# Patient Record
Sex: FEMALE | Race: WHITE | NOT HISPANIC OR LATINO | Employment: OTHER | URBAN - NONMETROPOLITAN AREA
[De-identification: names, ages, dates, MRNs, and addresses within clinical notes are randomized per-mention and may not be internally consistent; named-entity substitution may affect disease eponyms.]

---

## 2017-01-03 RX ORDER — OMEPRAZOLE 40 MG/1
CAPSULE, DELAYED RELEASE ORAL
Qty: 30 CAPSULE | Refills: 5 | Status: SHIPPED | OUTPATIENT
Start: 2017-01-03 | End: 2017-07-07 | Stop reason: SDUPTHER

## 2017-01-17 DIAGNOSIS — Z13.820 SCREENING FOR OSTEOPOROSIS: Primary | ICD-10-CM

## 2017-01-20 ENCOUNTER — TELEPHONE (OUTPATIENT)
Dept: FAMILY MEDICINE CLINIC | Facility: CLINIC | Age: 57
End: 2017-01-20

## 2017-01-20 NOTE — TELEPHONE ENCOUNTER
Pr Dr. MARIA LUISA Robison's order Ms. Light has been called with her recent DEXA Scan.  The scan indicates her Osteoporosis has gotten worse.  She has tried several medications in the past but has not been able to tolerate them.  She is willing to try the Prolia but only if her Insurance pays for it.    This information has been relayed back to Dr. MARIA LUISA Robison.

## 2017-01-23 ENCOUNTER — TELEPHONE (OUTPATIENT)
Dept: FAMILY MEDICINE CLINIC | Facility: CLINIC | Age: 57
End: 2017-01-23

## 2017-01-23 DIAGNOSIS — M81.0 OSTEOPOROSIS: Primary | ICD-10-CM

## 2017-01-24 NOTE — TELEPHONE ENCOUNTER
Patient has been called with the information on her Prolia Injections.  She states they have already called her and she should be able to  the injections tomorrow or Wednesday.  Her cost will only be $8.00.    ----- Message from Rosio Robison MD sent at 1/23/2017  7:15 AM CST -----  I will order and try to get PA.   THANKS

## 2017-03-02 ENCOUNTER — OFFICE VISIT (OUTPATIENT)
Dept: CARDIOLOGY | Facility: CLINIC | Age: 57
End: 2017-03-02

## 2017-03-02 VITALS
SYSTOLIC BLOOD PRESSURE: 121 MMHG | DIASTOLIC BLOOD PRESSURE: 70 MMHG | HEART RATE: 64 BPM | BODY MASS INDEX: 24.1 KG/M2 | WEIGHT: 136 LBS | HEIGHT: 63 IN

## 2017-03-02 DIAGNOSIS — I10 ESSENTIAL HYPERTENSION: ICD-10-CM

## 2017-03-02 DIAGNOSIS — R00.2 PALPITATIONS: Primary | ICD-10-CM

## 2017-03-02 DIAGNOSIS — R00.1 BRADYCARDIA: ICD-10-CM

## 2017-03-02 DIAGNOSIS — R56.9 SEIZURES (HCC): ICD-10-CM

## 2017-03-02 DIAGNOSIS — R42 DIZZINESS: ICD-10-CM

## 2017-03-02 DIAGNOSIS — I63.9 CEREBROVASCULAR ACCIDENT (CVA), UNSPECIFIED MECHANISM (HCC): ICD-10-CM

## 2017-03-02 PROCEDURE — 99214 OFFICE O/P EST MOD 30 MIN: CPT | Performed by: INTERNAL MEDICINE

## 2017-03-02 PROCEDURE — 93000 ELECTROCARDIOGRAM COMPLETE: CPT | Performed by: INTERNAL MEDICINE

## 2017-03-02 NOTE — PROGRESS NOTES
[facility] Cardiology  OFFICE NOTE    Cinthia Light  56 y.o. female    03/02/2017  1. Palpitations    2. Essential hypertension    3. Cerebrovascular accident (CVA), unspecified mechanism    4. Seizures    5. Bradycardia    6. 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  She was doing well until August 2016 where she fell and had weakness of the lower extremities and was evaluated at the [facility].  And subsequently was sent home.  She again had a lot of passing out episodes possible like seizure disorder and was started on Keppra at [facility] in [place].  She is seeing a neurologist at [facility] also.  Her EKG showed sinus rhythm at rate of 64 with low voltage.  She has been drinking 4-6 cups of coffee every day to keep her heart rate up or she feels dizzy.  And will do a Holter monitoring to assess her heart rate as she is not on any AV elana blocking agents.              Allergies   Allergen Reactions   • Acetaminophen    • Adhesive Tape    • Baclofen    • Bee Pollen    • Compazine [Prochlorperazine Edisylate]    • Demerol [Meperidine]    • Etodolac    • Lamictal [Lamotrigine]    • Latex    • Lortab [Hydrocodone-Acetaminophen]    • Milk-Related Compounds    • Morphine    • Morphine And Related    • Novocain [Procaine]    • Other      Paper tape, Bee/wasp sting, muscle relaxers   • Prochlorperazine    • Promethazine    • Ranitidine Hcl    • Reglan [Metoclopramide]    • Sulfa Antibiotics    • Tramadol    • Valium [Diazepam]          Past Medical History   Diagnosis Date   • Abnormal findings on diagnostic imaging of liver      Abnormal findings on diagnostic imaging of liver and biliary tract     • Allergic rhinitis    • Backache      Backache - stress fracture      • Chronic pain    • Chronic pancreatitis    • CVA (cerebral vascular accident)    • Diabetes mellitus associated with pancreatic disease    • Disease of esophagus, unspecified    • Diverticulitis of colon    • Dysphagia    • Epilepsy    • Esophagitis      Esophagitis - grade III      • Essential hypertension    • Gastroparesis      Gastroparesis syndrome - 544min      • GERD (gastroesophageal reflux disease) 07/27/2015     Gastroesophageal reflux disease   • Hemorrhoids      Hemorrhoids - internal & external    • Hiatal hernia    • Hyperlipidemia    • Insomnia    • Irritable bowel syndrome    • Keratoconjunctivitis sicca    • Left upper quadrant pain      Left upper quadrant pain - ? partial pancreas divisum on MRCP 9/17/13      • Low back pain    • Mass of chest wall    • Nausea and vomiting    • Neck pain    • Non-diabetic hypoglycemia    • Old myocardial infarction    • Osteoporosis    • Palpitations    • Paroxysmal supraventricular tachycardia    • Primary hyperparathyroidism    • Restless legs    • Secondary hyperparathyroidism    • Seizure disorder    • SOB (shortness of breath)    • Vaginal discharge      Offensive vaginal discharge      • Vitamin deficiency      Vitamin deficiency - D   • Vitreous floaters          Past Surgical History   Procedure Laterality Date   • Carpal tunnel release  11/05/1990     Left carpal tunnel release   • Endoscopy  07/31/2013     Colon endoscopy 92431 (2)-Diverticulum in sigmoid colon, descending colon, & transverse colon. Internal & external hemorrhoids found.   • Procedure generic converted  10/08/2015     Destruction of Benign Lesion (1-14) 04912 (1)   • Other surgical history  08/09/1990     Ear surgery (1)-Excision of cyst from left post auricular area   • Endoscopy  10/22/2014     EGD w/ biopsy 67453 (1) - A hiatus hernia was found in the esophagus. Esophagitis seen. Biopsy taken. Food was found in the body of the stomach   • Endoscopy  07/31/2013     EGD w/ tube 94616  (3)-Esophagitis seen. Biopsy taken. Hiatus hernia in esophagus. Gastritis in stomach. Biopsy taken. Normal dudoenum. Biopsy taken   • Other surgical history  04/27/2015     ERCP 64710 (2) - Limited exam of esophagus, stomach, and duodenum were normal. Pancreatic divisum. Pancreatic stricture at the minor ampulla.Advantix stent placement. Performed at [facility].   • Procedure generic converted  12/18/2014     ESOPHAGUS MOTILITY STUDY 31516 (1)   • Forearm surgery       Forearm or wrist surgery (1) - excision of ganglion cyst, left wrist.Ganglion cyst,volar aspect, left wrist   • Laparoscopy esophagogastric fundoplasty hybrid     • Injection of medication  06/20/2016     Kenalog (1)   • Gastrostomy tube placement  04/26/2007     gastrostomy tube, jehunostomy tube & intraoperative reduction of abdominal contents into the peritoneal cavity. Paraesophageal hiatal hernia   • Procedure generic converted  01/07/2003     REMOVE LESION NECK/CHEST 35536 (1) - Excision of deep soft tissue tumor, posterior neck & upper back.   • Other surgical history  11/27/2002     Remove lymph nodes, neck (1)- Excision of emlarged cervical lymph node. Subcutaneous mass, right postauricular neck. Enlarged cervical lymph node, postauricular area of the neck   • Other surgical history  03/2006     Repair of heart defect (1) - Repair of hole in heart Done @ [facility]   • Mammo screening unilat digital  03/18/2014     SCREENING MAMMOGRAPHY DIGITAL  (Medicare) (1)   • Thoracotomy  04/26/2007     Redo L thoracotomy, partial lung decoration, repair of paraesophageal hernia(Alloderm graft), placement of Marcaine infusion catheters & system, gastrostomy, feeding jejunostomy.   • Injection of medication  07/28/2015     Toradol (1)   • Total abdominal hysterectomy  03/24/2000     Exam under anesthesia & a ANDRE w/ anterior repair. Ureterovaginal prolapse w/ stress urinary incontinence, specially a 2+cystocele & 3+ uterine prolapse    • Tubal abdominal ligation  03/19/1986     postpartum tubal ligation.   • Cardiac catheterization           Family History   Problem Relation Age of Onset   • Hypertension Mother    • Cancer Father    • Lung cancer Father    • Diabetes Maternal Grandmother    • Heart disease Other    • Stroke Other    • Diabetes Other    • Glaucoma Maternal Uncle          Social History     Social History   • Marital status:      Spouse name: N/A   • Number of children: N/A   • Years of education: N/A     Occupational History   • Not on file.     Social History Main Topics   • Smoking status: Never Smoker   • Smokeless tobacco: Never Used   • Alcohol use No   • Drug use: No   • Sexual activity: Yes     Other Topics Concern   • Not on file     Social History Narrative         Current Outpatient Prescriptions   Medication Sig Dispense Refill   • aspirin 325 MG tablet Take 325 mg by mouth daily.     • cholecalciferol (VITAMIN D3) 1000 UNITS tablet Take 1,000 Units by mouth daily.     • denosumab (PROLIA) 60 MG/ML solution syringe Inject  under the skin Every 6 (Six) Months.     • diphenhydrAMINE (BENADRYL) 25 mg capsule Take 25 mg by mouth. 2 tablets in the morning,2 tablets at bedtime     • furosemide (LASIX) 20 MG tablet Take 1 tablet by mouth 2 (Two) Times a Day As Needed (swelling). 60 tablet 2   • levETIRAcetam (KEPPRA) 1000 MG tablet Take 1 tablet by mouth 2 (two) times a day. (Patient taking differently: Take 1,000 mg by mouth Daily.) 180 tablet 2   • lisinopril (PRINIVIL,ZESTRIL) 10 MG tablet Take 1 tablet by mouth Daily. 90 tablet 2   • omeprazole (priLOSEC) 40 MG capsule TAKE ONE CAPSULE BY MOUTH DAILY 30 capsule 5     No current facility-administered medications for this visit.          Review of Systems     Constitution: Denies any fatigue, fever or chills    HENT: Denies any headache, hearing impairment,     Eyes: Denies any blurring of vision, or photophobia     Cardivascular - history of PFO  "closure    Respiratory system-denies any COPD, shortness of breath,   sleep apnea.     Endocrine:   history of hyperlipidemia,      Musculoskeletal:  No history of arthritis with musculoskeletal problems    Gastrointestinal: History of GERD surgery    Genitourinary: No dysuria or hematuria    Neurological:    history of seizure disorder and stroke, no memory problems    Psychiatric/Behavioral:        No history of depression,  No history of bipolar disorder or schizophrenia     Hematological- no history of easy bruising or any bleeding diathesis            OBJECTIVE    Visit Vitals   • /70   • Pulse 64   • Ht 63\" (160 cm)   • Wt 136 lb (61.7 kg)   • BMI 24.09 kg/m2         Physical Exam     Constitutional: is oriented to person, place, and time.     Skin-warm and dry    Well developed and nourished in no acute distress      Head: Normocephalic and atraumatic.     Eyes: Pupils are equal, round, and reactive to light.     Neck: Neck supple. No bruit in the carotids, no elevation of JVD    Cardiovascular: [place] in the fifth intercostal space   Regular rate, and  Rhythm,    S1 greater than S2,  there is a systolic click    Pulmonary/Chest:   Air  Entry is equal on both sides  No wheezing or crackles,      Abdominal: Soft.  No hepatosplenomegaly, bowel sounds are present    Musculoskeletal: No kyphoscoliosis, no significant thickening of the joints    Neurological: is alert and oriented to person, place, and time.    cranial nerve are intact .   No motor or sensory deficit    Extremities-no edema, no radial femoral delay      Psychiatric: He has a normal mood and affect.                  His behavior is normal.             ECG 12 Lead  Date/Time: 3/2/2017 11:22 AM  Performed by: CHRISTIAN STEPHENS  Authorized by: CHRISTIAN STEPHENS   Comparison: not compared with previous ECG   Rhythm: sinus rhythm  Comments: Sinus rhythm with low voltage              Lab Results   Component Value Date    WBC 6.9 11/17/2016    " HGB 11.7 (L) 11/17/2016    HCT 36.1 11/17/2016    MCV 87.4 11/17/2016     11/17/2016     Lab Results   Component Value Date    GLUCOSE 92 11/17/2016    BUN 22 (H) 11/17/2016    CREATININE 1.1 (H) 11/17/2016    CO2 29 11/17/2016    CALCIUM 9.7 11/17/2016    ALBUMIN 4.0 11/17/2016    AST 25 11/17/2016    ALT 28 11/17/2016     No results found for: CHOL  Lab Results   Component Value Date    TRIG 81 06/28/2016    TRIG 87 04/23/2015    TRIG 152 03/13/2014     Lab Results   Component Value Date    HDL 59 (L) 06/28/2016    HDL 77 04/23/2015     Lab Results   Component Value Date    LDLCALC 96 06/28/2016    LDLCALC 90 04/23/2015    LDLCALC 85 03/13/2014     No results found for: LDL  No results found for: HDLLDLRATIO  No components found for: CHOLHDL  Lab Results   Component Value Date    HGBA1C 5.8 (H) 10/20/2016     Lab Results   Component Value Date    TSH 1.21 08/18/2015                  A/P    Bradycardia with dizziness will do a Holter monitoring to rule out significant bradycardia if it is so then she may need a dual-chamber pacemaker.  She'll wear the monitor for couple of days and will follow with me in 2 weeks.  Echo done in December 2016 showed normal LV systolic function.  She is under a lot of stress and depression as she lost her  last month from malignancy.    Osteoporosis she is on Prolia injections, takes lisinopril for high blood pressure.    She takes Prilosec for GERD and also vitamin D supplements.  Follow-up in  3 weeks    Johnny Oquendo MD  3/2/2017  11:18 AM    EMR Dragon/Transcription disclaimer:   Some of this note may be an electronic transcription/translation of spoken language to printed text. The electronic translation of spoken language may permit erroneous, or at times, nonsensical words or phrases to be inadvertently transcribed; Although I have reviewed the note for such errors, some may still exist.

## 2017-03-20 ENCOUNTER — OFFICE VISIT (OUTPATIENT)
Dept: OPHTHALMOLOGY | Facility: CLINIC | Age: 57
End: 2017-03-20

## 2017-03-20 DIAGNOSIS — H53.40 VISUAL FIELD DEFECT: Primary | ICD-10-CM

## 2017-03-20 PROCEDURE — 99213 OFFICE O/P EST LOW 20 MIN: CPT | Performed by: OPHTHALMOLOGY

## 2017-03-20 NOTE — PROGRESS NOTES
Subjective   Cinthia Light is a 56 y.o. female.   Chief Complaint   Patient presents with   • Follow-up     HPI     No change vision; Hx of possible CVA, seeing neurology [facility] Aug 2016: few subcentimeter nonenhancing focal bright signal abnormalities in the bilateral cerebral hemispheres in a parietal  distribution, on the T2-weighted and FLAIR sequence images, nonspecific in nature and could be due to chronic small vessel white  matter ischemic change, migraine headaches, Lyme's disease, old viral infection and extremely less likely due to multiple sclerosis       Last edited by José Sultana MD on 3/20/2017  1:21 PM.       Review of Systems    Objective   Visual Acuity (Snellen - Linear)      Right Left   Dist cc 20/20 -1 20/40       Correction:  Glasses         Wearing Rx      Sphere Cylinder Axis Add   Right -1.00 +0.75 180 +2.50   Left -0.75 +1.75 163 +2.75                 Pupils      Pupils   Right PERRL   Left PERRL            Not recorded         Extraocular Movement      Right Left   Result Full Full              Tonometry (Applanation, 1:22 PM)      Right Left   Pressure 17 17              Main Ophthalmology Exam     External Exam      Right Left    External Normal Normal      Slit Lamp Exam      Right Left    Lids/Lashes Normal Normal    Conjunctiva/Sclera White and quiet White and quiet    Cornea Clear Clear    Anterior Chamber Deep and quiet Deep and quiet    Iris Round and reactive Round and reactive    Lens Clear Clear    Vitreous Normal Normal      Fundus Exam      Right Left    Disc Normal Normal, no palor    Macula Normal Normal    Vessels Normal Normal                Assessment/Plan   Diagnoses and all orders for this visit:    Visual field defect  Comments:  OS, unchange  Plan:   cont f/u with neurology    Return in about 3 months (around 6/20/2017) for Visual Field 30-2.

## 2017-03-23 ENCOUNTER — OFFICE VISIT (OUTPATIENT)
Dept: CARDIOLOGY | Facility: CLINIC | Age: 57
End: 2017-03-23

## 2017-03-23 ENCOUNTER — OFFICE VISIT (OUTPATIENT)
Dept: FAMILY MEDICINE CLINIC | Facility: CLINIC | Age: 57
End: 2017-03-23

## 2017-03-23 VITALS
HEIGHT: 63 IN | SYSTOLIC BLOOD PRESSURE: 120 MMHG | DIASTOLIC BLOOD PRESSURE: 82 MMHG | WEIGHT: 138 LBS | BODY MASS INDEX: 24.45 KG/M2

## 2017-03-23 VITALS
HEIGHT: 63 IN | BODY MASS INDEX: 24.36 KG/M2 | DIASTOLIC BLOOD PRESSURE: 86 MMHG | WEIGHT: 137.5 LBS | HEART RATE: 60 BPM | SYSTOLIC BLOOD PRESSURE: 132 MMHG

## 2017-03-23 DIAGNOSIS — G40.909 SEIZURE DISORDER (HCC): Primary | ICD-10-CM

## 2017-03-23 DIAGNOSIS — R00.1 BRADYCARDIA: Primary | ICD-10-CM

## 2017-03-23 PROCEDURE — 99212 OFFICE O/P EST SF 10 MIN: CPT | Performed by: INTERNAL MEDICINE

## 2017-03-23 PROCEDURE — 99213 OFFICE O/P EST LOW 20 MIN: CPT | Performed by: FAMILY MEDICINE

## 2017-03-23 RX ORDER — ETHOSUXIMIDE 250 MG/1
250 CAPSULE ORAL 2 TIMES DAILY
Qty: 60 CAPSULE | Refills: 2 | Status: SHIPPED | OUTPATIENT
Start: 2017-03-23 | End: 2017-09-11 | Stop reason: SDUPTHER

## 2017-03-23 NOTE — PROGRESS NOTES
[facility] Cardiology  OFFICE PROGRESS NOTE    Name: Cinthia Light  Age/Sex: 56 y.o. female  :  1960        PCP: Rosio Robison MD    Vital Signs  Heart Rate:  [60] 60  BP: (132)/(86) 132/86  Body mass index is 24.36 kg/(m^2).      Subjective  follow-up bradycardia and even monitor    56-year-old lady who was found to be bradycardic with low dose beta blockers stop that and today when monitor which showed normal sinus rhythm lowest heart rate was less than 45 only when she was sleeping and there was one episode of sinus tachycardia and that was when she eats a full stomach which happens quite often.  There was no other episodes of bradycardia and reassured her at this point.      Patient Active Problem List   Diagnosis   • Secondary hyperparathyroidism   • Restless legs   • Osteoporosis   • Old myocardial infarction   • Essential hypertension   • Diabetes mellitus associated with pancreatic disease   • Chronic pancreatitis   • Seizure disorder   • SOB (shortness of breath)   • Disease of esophagus, unspecified   • Paroxysmal supraventricular tachycardia   • Palpitations   • CVA (cerebral vascular accident)   • Seizures   • Bradycardia   • Dizziness       Past Medical History:   Diagnosis Date   • Abnormal findings on diagnostic imaging of liver     Abnormal findings on diagnostic imaging of liver and biliary tract     • Allergic rhinitis    • Backache     Backache - stress fracture     • Chronic pain    • Chronic pancreatitis    • CVA (cerebral vascular accident)    • Diabetes mellitus associated with pancreatic disease    • Disease of esophagus, unspecified    • Diverticulitis of colon    • Dysphagia    • Epilepsy    • Esophagitis     Esophagitis - grade III      • Essential hypertension    • Gastroparesis     Gastroparesis syndrome - 544min      • GERD (gastroesophageal reflux disease) 2015    Gastroesophageal reflux disease   • Hemorrhoids     Hemorrhoids -  internal & external    • Hiatal hernia    • Hyperlipidemia    • Insomnia    • Irritable bowel syndrome    • Keratoconjunctivitis sicca    • Left upper quadrant pain     Left upper quadrant pain - ? partial pancreas divisum on MRCP 9/17/13      • Low back pain    • Mass of chest wall    • Nausea and vomiting    • Neck pain    • Non-diabetic hypoglycemia    • Old myocardial infarction    • Osteoporosis    • Palpitations    • Paroxysmal supraventricular tachycardia    • Primary hyperparathyroidism    • Restless legs    • Secondary hyperparathyroidism    • Seizure disorder    • SOB (shortness of breath)    • Vaginal discharge     Offensive vaginal discharge      • Vitamin deficiency     Vitamin deficiency - D   • Vitreous floaters        Current Outpatient Prescriptions   Medication Sig Dispense Refill   • aspirin 325 MG tablet Take 325 mg by mouth daily.     • cholecalciferol (VITAMIN D3) 1000 UNITS tablet Take 1,000 Units by mouth daily.     • denosumab (PROLIA) 60 MG/ML solution syringe Inject  under the skin Every 6 (Six) Months.     • diphenhydrAMINE (BENADRYL) 25 mg capsule Take 25 mg by mouth. 2 tablets in the morning,2 tablets at bedtime     • furosemide (LASIX) 20 MG tablet Take 1 tablet by mouth 2 (Two) Times a Day As Needed (swelling). 60 tablet 2   • levETIRAcetam (KEPPRA) 1000 MG tablet Take 1 tablet by mouth 2 (two) times a day. (Patient taking differently: Take 1,000 mg by mouth Daily.) 180 tablet 2   • lisinopril (PRINIVIL,ZESTRIL) 10 MG tablet Take 1 tablet by mouth Daily. 90 tablet 2   • omeprazole (priLOSEC) 40 MG capsule TAKE ONE CAPSULE BY MOUTH DAILY 30 capsule 5     No current facility-administered medications for this visit.        Past Surgical History:   Procedure Laterality Date   • CARDIAC CATHETERIZATION     • CARPAL TUNNEL RELEASE  11/05/1990    Left carpal tunnel release   • ENDOSCOPY  07/31/2013    Colon endoscopy 17803 (2)-Diverticulum in sigmoid colon, descending colon, & transverse  colon. Internal & external hemorrhoids found.   • ENDOSCOPY  10/22/2014    EGD w/ biopsy 87358 (1) - A hiatus hernia was found in the esophagus. Esophagitis seen. Biopsy taken. Food was found in the body of the stomach   • ENDOSCOPY  07/31/2013    EGD w/ tube 76887 (3)-Esophagitis seen. Biopsy taken. Hiatus hernia in esophagus. Gastritis in stomach. Biopsy taken. Normal dudoenum. Biopsy taken   • FOREARM SURGERY      Forearm or wrist surgery (1) - excision of ganglion cyst, left wrist.Ganglion cyst,volar aspect, left wrist   • GTUBE INSERTION  04/26/2007    gastrostomy tube, jehunostomy tube & intraoperative reduction of abdominal contents into the peritoneal cavity. Paraesophageal hiatal hernia   • INJECTION OF MEDICATION  06/20/2016    Kenalog (1)   • INJECTION OF MEDICATION  07/28/2015    Toradol (1)   • LAPAROSCOPY ESOPHAGOGASTRIC FUNDOPLASTY HYBRID     • MAMMO SCREENING UNILAT DIGITAL  03/18/2014    SCREENING MAMMOGRAPHY DIGITAL  (Medicare) (1)   • OTHER SURGICAL HISTORY  08/09/1990    Ear surgery (1)-Excision of cyst from left post auricular area   • OTHER SURGICAL HISTORY  04/27/2015    ERCP 32228 (2) - Limited exam of esophagus, stomach, and duodenum were normal. Pancreatic divisum. Pancreatic stricture at the minor ampulla.Advantix stent placement. Performed at [facility].   • OTHER SURGICAL HISTORY  11/27/2002    Remove lymph nodes, neck (1)- Excision of emlarged cervical lymph node. Subcutaneous mass, right postauricular neck. Enlarged cervical lymph node, postauricular area of the neck   • OTHER SURGICAL HISTORY  03/2006    Repair of heart defect (1) - Repair of hole in heart Done @ [facility]   • PROCEDURE GENERIC CONVERTED  10/08/2015    Destruction of Benign Lesion (1-14) 15093 (1)   • PROCEDURE GENERIC CONVERTED  12/18/2014    ESOPHAGUS MOTILITY STUDY 58472 (1)   • PROCEDURE GENERIC CONVERTED  01/07/2003    REMOVE LESION NECK/CHEST 86310 (1) - Excision of deep soft tissue  tumor, posterior neck & upper back.   • THORACOTOMY  04/26/2007    Redo L thoracotomy, partial lung decoration, repair of paraesophageal hernia(Alloderm graft), placement of Marcaine infusion catheters & system, gastrostomy, feeding jejunostomy.   • TOTAL ABDOMINAL HYSTERECTOMY  03/24/2000    Exam under anesthesia & a ANDRE w/ anterior repair. Ureterovaginal prolapse w/ stress urinary incontinence, specially a 2+cystocele & 3+ uterine prolapse   • TUBAL ABDOMINAL LIGATION  03/19/1986    postpartum tubal ligation.       Social History     Social History   • Marital status:      Spouse name: N/A   • Number of children: N/A   • Years of education: N/A     Occupational History   • Not on file.     Social History Main Topics   • Smoking status: Never Smoker   • Smokeless tobacco: Never Used   • Alcohol use No   • Drug use: No   • Sexual activity: Yes     Other Topics Concern   • Not on file     Social History Narrative       O/E    Neck supple  no Jvd , no thyroid enlargement  Chest air entry equal, normal respiration   no rhonchi or creps  Cardiovascular system regular rate and rythem , no murmurs  Abdomen soft no tenderness, bowel sounds present, no hepatosplenomegaly.  CNS - alert , oriented to place and time  No motor or sensory deficit.  Cranial nerves intact  musculoskeletal no deformity of the back or spine   Extremeties  no edema ,   pulses equal on both side      Procedures          A/P    Bradycardia resolved after stopping the beta blockers even monitor was unremarkable, reassured her continue all her current medications and follow with me in 8 months          Johnny Oquendo MD  03/23/17  11:37 AM    EMR Dragon/Transcription disclaimer:   Some of this note may be an electronic transcription/translation of spoken language to printed text. The electronic translation of spoken language may permit erroneous, or at times, nonsensical words or phrases to be inadvertently transcribed; Although I have  reviewed the note for such errors, some may still exist.

## 2017-03-23 NOTE — PROGRESS NOTES
"Subjective   Cinthia Light is a 56 y.o. female.     History of Present Illness   Ms. Light is a 55yo female that presents today for follow-up on seizure \"spells\".  She has seen Neurologist at [place]. She has been trying to taper off her Keppra because she is clamping down and causing her pain. She has been on several other seizure medications. Gapapentin didn't help in the past.  She has had allergies with lamictal.      Current Outpatient Prescriptions:   •  aspirin 325 MG tablet, Take 325 mg by mouth daily., Disp: , Rfl:   •  cholecalciferol (VITAMIN D3) 1000 UNITS tablet, Take 1,000 Units by mouth daily., Disp: , Rfl:   •  denosumab (PROLIA) 60 MG/ML solution syringe, Inject  under the skin Every 6 (Six) Months., Disp: , Rfl:   •  diphenhydrAMINE (BENADRYL) 25 mg capsule, Take 25 mg by mouth. 2 tablets in the morning,2 tablets at bedtime, Disp: , Rfl:   •  furosemide (LASIX) 20 MG tablet, Take 1 tablet by mouth 2 (Two) Times a Day As Needed (swelling)., Disp: 60 tablet, Rfl: 2  •  levETIRAcetam (KEPPRA) 1000 MG tablet, Take 1 tablet by mouth 2 (two) times a day. (Patient taking differently: Take 1,000 mg by mouth Daily.), Disp: 180 tablet, Rfl: 2  •  lisinopril (PRINIVIL,ZESTRIL) 10 MG tablet, Take 1 tablet by mouth Daily., Disp: 90 tablet, Rfl: 2  •  omeprazole (priLOSEC) 40 MG capsule, TAKE ONE CAPSULE BY MOUTH DAILY, Disp: 30 capsule, Rfl: 5    The following portions of the patient's history were reviewed and updated as appropriate: allergies, current medications, past family history, past medical history, past social history, past surgical history and problem list.    Review of Systems   Constitutional: Positive for activity change and fatigue. Negative for appetite change and unexpected weight change.   Cardiovascular: Negative for chest pain, palpitations and leg swelling.   Gastrointestinal: Negative for abdominal distention, abdominal pain, nausea and vomiting.   Skin: Negative for " "pallor, rash and wound.   Neurological: Positive for tremors, weakness, light-headedness and headaches.   Psychiatric/Behavioral: Positive for dysphoric mood and sleep disturbance. Negative for hallucinations, self-injury and suicidal ideas.       Objective    Vitals:    03/23/17 1527   BP: 120/82   Weight: 138 lb (62.6 kg)   Height: 63\" (160 cm)     Physical Exam   Constitutional: She is oriented to person, place, and time. She appears well-developed and well-nourished. No distress.   Balance returned to normal.     Cardiovascular: Normal rate, regular rhythm and normal heart sounds.    No murmur heard.  trace LE edema.   Pulmonary/Chest: Effort normal and breath sounds normal. No respiratory distress.   Abdominal: Soft. Bowel sounds are normal. She exhibits no distension. There is no tenderness.   Neurological: She is alert and oriented to person, place, and time. She has normal reflexes.   She has 4/5 strength on LLE.  5/5 LUE.  5/5 RLE.     Psychiatric: She has a normal mood and affect. Her behavior is normal. Judgment and thought content normal.   Nursing note and vitals reviewed.      Assessment/Plan   Problems Addressed this Visit        Nervous and Auditory    Seizure disorder - Primary    Relevant Medications    ethosuximide (ZARONTIN) 250 MG capsule      She has been on several different seizure medications. Now on nothing because she quit on her own. Has been having small seizures.  Will start her on zarontin and monitor her closelt.  RTC in 1-2 months or sooner PRN             "

## 2017-04-06 ENCOUNTER — TELEPHONE (OUTPATIENT)
Dept: FAMILY MEDICINE CLINIC | Facility: CLINIC | Age: 57
End: 2017-04-06

## 2017-04-06 RX ORDER — LISINOPRIL 10 MG/1
10 TABLET ORAL DAILY
Qty: 90 TABLET | Refills: 2 | Status: SHIPPED | OUTPATIENT
Start: 2017-04-06 | End: 2017-11-27 | Stop reason: SDUPTHER

## 2017-04-06 NOTE — TELEPHONE ENCOUNTER
----- Message from Gretta Mullins sent at 4/6/2017 10:06 AM CDT -----  Regarding: MED REFILL DR [facility]ORRES PT  Contact: [phone]  Cinthia needs a refill for Lisinopril 10 mg to [facility].

## 2017-04-26 ENCOUNTER — APPOINTMENT (OUTPATIENT)
Dept: LAB | Facility: HOSPITAL | Age: 57
End: 2017-04-26

## 2017-04-26 ENCOUNTER — OFFICE VISIT (OUTPATIENT)
Dept: FAMILY MEDICINE CLINIC | Facility: CLINIC | Age: 57
End: 2017-04-26

## 2017-04-26 VITALS
DIASTOLIC BLOOD PRESSURE: 72 MMHG | HEART RATE: 64 BPM | WEIGHT: 137.5 LBS | BODY MASS INDEX: 24.36 KG/M2 | HEIGHT: 63 IN | SYSTOLIC BLOOD PRESSURE: 106 MMHG

## 2017-04-26 DIAGNOSIS — Z00.00 MEDICARE ANNUAL WELLNESS VISIT, INITIAL: Primary | ICD-10-CM

## 2017-04-26 DIAGNOSIS — G40.909 SEIZURE DISORDER (HCC): ICD-10-CM

## 2017-04-26 DIAGNOSIS — Z79.899 HIGH RISK MEDICATION USE: ICD-10-CM

## 2017-04-26 PROBLEM — R73.03 PREDIABETES: Status: ACTIVE | Noted: 2017-04-26

## 2017-04-26 LAB
ALBUMIN SERPL-MCNC: 4.2 G/DL (ref 3.4–4.8)
ALBUMIN/GLOB SERPL: 1.5 G/DL (ref 1.1–1.8)
ALP SERPL-CCNC: 77 U/L (ref 38–126)
ALT SERPL W P-5'-P-CCNC: 26 U/L (ref 9–52)
ANION GAP SERPL CALCULATED.3IONS-SCNC: 9 MMOL/L (ref 5–15)
AST SERPL-CCNC: 35 U/L (ref 14–36)
BILIRUB SERPL-MCNC: 0.4 MG/DL (ref 0.2–1.3)
BUN BLD-MCNC: 23 MG/DL (ref 7–21)
BUN/CREAT SERPL: 24.7 (ref 7–25)
CALCIUM SPEC-SCNC: 10.1 MG/DL (ref 8.4–10.2)
CHLORIDE SERPL-SCNC: 104 MMOL/L (ref 95–110)
CO2 SERPL-SCNC: 24 MMOL/L (ref 22–31)
CREAT BLD-MCNC: 0.93 MG/DL (ref 0.5–1)
DEPRECATED RDW RBC AUTO: 43.8 FL (ref 36.4–46.3)
ERYTHROCYTE [DISTWIDTH] IN BLOOD BY AUTOMATED COUNT: 14.4 % (ref 11.5–14.5)
GFR SERPL CREATININE-BSD FRML MDRD: 62 ML/MIN/1.73 (ref 51–120)
GLOBULIN UR ELPH-MCNC: 2.8 GM/DL (ref 2.3–3.5)
GLUCOSE BLD-MCNC: 94 MG/DL (ref 60–100)
HCT VFR BLD AUTO: 35.6 % (ref 35–45)
HGB BLD-MCNC: 11.6 G/DL (ref 12–15.5)
MCH RBC QN AUTO: 27.4 PG (ref 26.5–34)
MCHC RBC AUTO-ENTMCNC: 32.6 G/DL (ref 31.4–36)
MCV RBC AUTO: 84 FL (ref 80–98)
PLATELET # BLD AUTO: 308 10*3/MM3 (ref 150–450)
PMV BLD AUTO: 9.9 FL (ref 8–12)
POTASSIUM BLD-SCNC: 4.7 MMOL/L (ref 3.5–5.1)
PROT SERPL-MCNC: 7 G/DL (ref 6.3–8.6)
RBC # BLD AUTO: 4.24 10*6/MM3 (ref 3.77–5.16)
SODIUM BLD-SCNC: 137 MMOL/L (ref 137–145)
WBC NRBC COR # BLD: 6.73 10*3/MM3 (ref 3.2–9.8)

## 2017-04-26 PROCEDURE — 85027 COMPLETE CBC AUTOMATED: CPT | Performed by: FAMILY MEDICINE

## 2017-04-26 PROCEDURE — 36415 COLL VENOUS BLD VENIPUNCTURE: CPT | Performed by: FAMILY MEDICINE

## 2017-04-26 PROCEDURE — G0438 PPPS, INITIAL VISIT: HCPCS | Performed by: FAMILY MEDICINE

## 2017-04-26 PROCEDURE — 80168 ASSAY OF ETHOSUXIMIDE: CPT | Performed by: FAMILY MEDICINE

## 2017-04-26 PROCEDURE — 80053 COMPREHEN METABOLIC PANEL: CPT | Performed by: FAMILY MEDICINE

## 2017-04-26 RX ORDER — ALBUTEROL SULFATE 90 UG/1
2 AEROSOL, METERED RESPIRATORY (INHALATION) EVERY 4 HOURS PRN
Qty: 1 INHALER | Refills: 2 | Status: SHIPPED | OUTPATIENT
Start: 2017-04-26 | End: 2018-07-09 | Stop reason: SDUPTHER

## 2017-04-26 NOTE — PROGRESS NOTES
QUICK REFERENCE INFORMATION:  The ABCs of the Annual Wellness Visit    Initial Medicare Wellness Visit    HEALTH RISK ASSESSMENT    1960    Recent Hospitalizations:  Recently treated at the following:  [facility] and Other: [facility]..      Current Medical Providers:  Patient Care Team:  Rosio Robison MD as PCP - General  Rosio Robison MD as PCP - Claims Attributed - PLEASE DO NOT REMOVE        Smoking Status:  History   Smoking Status   • Never Smoker   Smokeless Tobacco   • Never Used       Alcohol Consumption:  History   Alcohol Use No       Depression Screen:   PHQ-9 Depression Screening 4/26/2017   Little interest or pleasure in doing things 0   Feeling down, depressed, or hopeless 0   Trouble falling or staying asleep, or sleeping too much 3   Feeling tired or having little energy 3   Poor appetite or overeating 0   Feeling bad about yourself - or that you are a failure or have let yourself or your family down 0   Trouble concentrating on things, such as reading the newspaper or watching television 0   Moving or speaking so slowly that other people could have noticed. Or the opposite - being so fidgety or restless that you have been moving around a lot more than usual 0   Thoughts that you would be better off dead, or of hurting yourself in some way 0   PHQ-9 Total Score 6   If you checked off any problems, how difficult have these problems made it for you to do your work, take care of things at home, or get along with other people? Not difficult at all       Health Habits and Functional and Cognitive Screening:  Functional & Cognitive Status 4/26/2017   Do you have difficulty preparing food and eating? No   Do you have difficulty bathing yourself? No   Do you have difficulty getting dressed? No   Do you have difficulty using the toilet? No   Do you have difficulty moving around from place to place? No   In the past year have you fallen or experienced a near fall? Yes    Do you need help using the phone?  No   Are you deaf or do you have serious difficulty hearing?  No   Do you need help with transportation? No   Do you need help shopping? No   Do you need help preparing meals?  No   Do you need help with housework?  No   Do you need help with laundry? No   Do you need help taking your medications? No   Do you need help managing money? No   Do you have difficulty concentrating, remembering or making decisions? No       Health Habits  Current Diet: Low Fat Diet  Dental Exam: Up to date (2016)  Eye Exam: Up to date (march 2017)  Exercise (times per week): 3 times per week  Current Exercise Activities Include: Walking      Does the patient have evidence of cognitive impairment? No    Asiprin use counseling: Taking ASA appropriately as indicated      Recent Lab Results:    Visual Acuity:   Visual Acuity Screening    Right eye Left eye Both eyes   Without correction:      With correction: 20/15 20/25 20/13       Age-appropriate Screening Schedule:  Refer to the list below for future screening recommendations based on patient's age, sex and/or medical conditions. Orders for these recommended tests are listed in the plan section. The patient has been provided with a written plan.    Health Maintenance   Topic Date Due   • TDAP/TD VACCINES (1 - Tdap) 06/09/1978   • DIABETIC FOOT EXAM  10/20/2016   • DIABETIC EYE EXAM  10/20/2016   • HEMOGLOBIN A1C  04/20/2017   • MAMMOGRAM  06/23/2017   • LIPID PANEL  06/28/2017   • URINE MICROALBUMIN  10/20/2017   • DXA SCAN  01/17/2019   • PAP SMEAR  12/23/2019   • COLONOSCOPY  09/01/2025   • PNEUMOCOCCAL VACCINE (19-64 MEDIUM RISK)  Addressed   • INFLUENZA VACCINE  Addressed        Subjective   History of Present Illness    Cinthia Light is a 56 y.o. female who presents for an Annual Wellness Visit.    The following portions of the patient's history were reviewed and updated as appropriate: allergies, current medications, past family history,  past medical history, past social history, past surgical history and problem list.    Outpatient Medications Prior to Visit   Medication Sig Dispense Refill   • aspirin 325 MG tablet Take 325 mg by mouth daily.     • cholecalciferol (VITAMIN D3) 1000 UNITS tablet Take 1,000 Units by mouth daily.     • denosumab (PROLIA) 60 MG/ML solution syringe Inject  under the skin Every 6 (Six) Months.     • diphenhydrAMINE (BENADRYL) 25 mg capsule Take 25 mg by mouth. 2 tablets in the morning,2 tablets at bedtime     • ethosuximide (ZARONTIN) 250 MG capsule Take 1 capsule by mouth 2 (Two) Times a Day. (Patient taking differently: Take 250 mg by mouth Daily.) 60 capsule 2   • furosemide (LASIX) 20 MG tablet Take 1 tablet by mouth 2 (Two) Times a Day As Needed (swelling). 60 tablet 2   • lisinopril (PRINIVIL,ZESTRIL) 10 MG tablet Take 1 tablet by mouth Daily. 90 tablet 2   • omeprazole (priLOSEC) 40 MG capsule TAKE ONE CAPSULE BY MOUTH DAILY 30 capsule 5     No facility-administered medications prior to visit.        Patient Active Problem List   Diagnosis   • Secondary hyperparathyroidism   • Restless legs   • Osteoporosis   • Old myocardial infarction   • Essential hypertension   • Diabetes mellitus associated with pancreatic disease   • Chronic pancreatitis   • Seizure disorder   • SOB (shortness of breath)   • Disease of esophagus, unspecified   • Paroxysmal supraventricular tachycardia   • Palpitations   • CVA (cerebral vascular accident)   • Seizures   • Bradycardia   • Dizziness       Advance Care Planning:  has NO advance directive - information provided to the patient today    Identification of Risk Factors:  Risk factors include: cardiovascular risk and increased fall risk.    Review of Systems   Constitutional: Negative for activity change, appetite change, fatigue and unexpected weight change.   Eyes: Negative for visual disturbance.   Cardiovascular: Negative for chest pain, palpitations and leg swelling.  "  Gastrointestinal: Negative for abdominal distention, abdominal pain, constipation, diarrhea, nausea and vomiting.   Musculoskeletal: Positive for arthralgias and back pain.   Skin: Negative for pallor, rash and wound.   Psychiatric/Behavioral: Negative for dysphoric mood and sleep disturbance. The patient is not nervous/anxious.        Compared to one year ago, the patient feels her physical health is worse.  Compared to one year ago, the patient feels her mental health is the same.    Objective     Physical Exam   Constitutional: She is oriented to person, place, and time. She appears well-developed and well-nourished. No distress.   Cardiovascular: Normal rate, regular rhythm and normal heart sounds.    No murmur heard.  No LE edema.   Pulmonary/Chest: Effort normal and breath sounds normal. No respiratory distress.   Abdominal: Soft. Bowel sounds are normal. She exhibits no distension. There is no tenderness.   Neurological: She is alert and oriented to person, place, and time.   Psychiatric: She has a normal mood and affect. Her behavior is normal. Judgment and thought content normal.   Nursing note and vitals reviewed.      Vitals:    04/26/17 1027   BP: 106/72   Pulse: 64   Weight: 137 lb 8 oz (62.4 kg)   Height: 63\" (160 cm)       Body mass index is 24.36 kg/(m^2).  Discussed the patient's BMI with her. The BMI is in the acceptable range.    Assessment/Plan   Patient Self-Management and Personalized Health Advice  The patient has been provided with information about: fall prevention and designing advance directives and preventive services including:   · Advance directive.    Visit Diagnoses:  No diagnosis found.    No orders of the defined types were placed in this encounter.      Outpatient Encounter Prescriptions as of 4/26/2017   Medication Sig Dispense Refill   • aspirin 325 MG tablet Take 325 mg by mouth daily.     • cholecalciferol (VITAMIN D3) 1000 UNITS tablet Take 1,000 Units by mouth daily.     • " denosumab (PROLIA) 60 MG/ML solution syringe Inject  under the skin Every 6 (Six) Months.     • diphenhydrAMINE (BENADRYL) 25 mg capsule Take 25 mg by mouth. 2 tablets in the morning,2 tablets at bedtime     • ethosuximide (ZARONTIN) 250 MG capsule Take 1 capsule by mouth 2 (Two) Times a Day. (Patient taking differently: Take 250 mg by mouth Daily.) 60 capsule 2   • furosemide (LASIX) 20 MG tablet Take 1 tablet by mouth 2 (Two) Times a Day As Needed (swelling). 60 tablet 2   • lisinopril (PRINIVIL,ZESTRIL) 10 MG tablet Take 1 tablet by mouth Daily. 90 tablet 2   • omeprazole (priLOSEC) 40 MG capsule TAKE ONE CAPSULE BY MOUTH DAILY 30 capsule 5     No facility-administered encounter medications on file as of 4/26/2017.        Reviewed use of high risk medication in the elderly: yes  Reviewed for potential of harmful drug interactions in the elderly: yes    Follow Up:  Return in about 3 months (around 7/26/2017) for Recheck.     An After Visit Summary and PPPS with all of these plans were given to the patient.

## 2017-04-26 NOTE — PROGRESS NOTES
Subjective   Cinthia Light is a 56 y.o. female.     History of Present Illness   Ms. Light is a 57yo female that presents today for follow-up on seizure disorder.  She was started on a new medication last time. She has been intolerant to many medications in the past.  She says that since she was here last time she hasn't had more seizures.  Has had some issues with jaw clinching at night but she hadn't been taking her medication BID.  No side effects with the medications.      Current Outpatient Prescriptions:   •  aspirin 325 MG tablet, Take 325 mg by mouth daily., Disp: , Rfl:   •  cholecalciferol (VITAMIN D3) 1000 UNITS tablet, Take 1,000 Units by mouth daily., Disp: , Rfl:   •  denosumab (PROLIA) 60 MG/ML solution syringe, Inject  under the skin Every 6 (Six) Months., Disp: , Rfl:   •  diphenhydrAMINE (BENADRYL) 25 mg capsule, Take 25 mg by mouth. 2 tablets in the morning,2 tablets at bedtime, Disp: , Rfl:   •  ethosuximide (ZARONTIN) 250 MG capsule, Take 1 capsule by mouth 2 (Two) Times a Day. (Patient taking differently: Take 250 mg by mouth Daily.), Disp: 60 capsule, Rfl: 2  •  furosemide (LASIX) 20 MG tablet, Take 1 tablet by mouth 2 (Two) Times a Day As Needed (swelling)., Disp: 60 tablet, Rfl: 2  •  lisinopril (PRINIVIL,ZESTRIL) 10 MG tablet, Take 1 tablet by mouth Daily., Disp: 90 tablet, Rfl: 2  •  omeprazole (priLOSEC) 40 MG capsule, TAKE ONE CAPSULE BY MOUTH DAILY, Disp: 30 capsule, Rfl: 5    The following portions of the patient's history were reviewed and updated as appropriate: allergies, current medications, past family history, past medical history, past social history, past surgical history and problem list.    Review of Systems   Constitutional: Negative for activity change, appetite change, fatigue and unexpected weight change.   Cardiovascular: Negative for chest pain, palpitations and leg swelling.   Gastrointestinal: Negative for abdominal distention, abdominal pain, nausea and  "vomiting.   Skin: Negative for pallor, rash and wound.   Neurological: Negative for tremors, weakness, light-headedness and headaches.   Psychiatric/Behavioral: Negative for dysphoric mood, hallucinations, self-injury, sleep disturbance and suicidal ideas.       Objective    Vitals:    04/26/17 1027   BP: 106/72   Pulse: 64   Weight: 137 lb 8 oz (62.4 kg)   Height: 63\" (160 cm)     Physical Exam   Constitutional: She is oriented to person, place, and time. She appears well-developed and well-nourished. No distress.   Balance returned to normal.     Cardiovascular: Normal rate, regular rhythm and normal heart sounds.    No murmur heard.  trace LE edema.   Pulmonary/Chest: Effort normal and breath sounds normal. No respiratory distress.   Abdominal: Soft. Bowel sounds are normal. She exhibits no distension. There is no tenderness.   Neurological: She is alert and oriented to person, place, and time. She has normal reflexes.   She has 4/5 strength on LLE.  5/5 LUE.  5/5 RLE.     Psychiatric: She has a normal mood and affect. Her behavior is normal. Judgment and thought content normal.   Nursing note and vitals reviewed.      Assessment/Plan   Problems Addressed this Visit        Nervous and Auditory    Seizure disorder    Relevant Orders    Ethosuximide Level      Other Visit Diagnoses     Medicare annual wellness visit, initial    -  Primary    High risk medication use        Relevant Orders    Ethosuximide Level    Comprehensive Metabolic Panel    CBC (No Diff)        She is doing well on new medication. Will check levels, CMP, and CBC today.  Increase to BID.  RTC in 3 months or sooner PRN           "

## 2017-04-28 ENCOUNTER — TELEPHONE (OUTPATIENT)
Dept: FAMILY MEDICINE CLINIC | Facility: CLINIC | Age: 57
End: 2017-04-28

## 2017-04-28 LAB — ETHOSUXIMIDE SERPL-MCNC: 19 UG/ML (ref 40–100)

## 2017-04-28 NOTE — TELEPHONE ENCOUNTER
Pr , Ms. Light has been called with her recent lab results & recommendations.  Continue her current medications and follow-up as planned or sooner if any problems.      ----- Message from Rosio Robison MD sent at 4/28/2017  7:19 AM CDT -----  Please let her know that her level wasn't too elevated.  Other labs okay. Follow-up as planned and increase seizure medication to BID as discussed

## 2017-04-28 NOTE — PROGRESS NOTES
Pr , Ms. Light has been called with her recent lab results & recommendations.  Continue her current medications and follow-up as planned or sooner if any problems.

## 2017-06-19 ENCOUNTER — OFFICE VISIT (OUTPATIENT)
Dept: OPHTHALMOLOGY | Facility: CLINIC | Age: 57
End: 2017-06-19

## 2017-06-19 DIAGNOSIS — I63.9 CEREBROVASCULAR ACCIDENT (CVA), UNSPECIFIED MECHANISM (HCC): Primary | ICD-10-CM

## 2017-06-19 DIAGNOSIS — H53.40 VISUAL FIELD DEFECT: ICD-10-CM

## 2017-06-19 PROCEDURE — 99213 OFFICE O/P EST LOW 20 MIN: CPT | Performed by: OPHTHALMOLOGY

## 2017-06-19 PROCEDURE — 92083 EXTENDED VISUAL FIELD XM: CPT | Performed by: OPHTHALMOLOGY

## 2017-06-19 NOTE — PROGRESS NOTES
Subjective   Cinthia Light is a 57 y.o. female.   Chief Complaint   Patient presents with   • Glaucoma     HPI     Glaucoma   Laterality: both eyes           Comments   Vision better;med change; hx of CVA       Last edited by José Sultana MD on 6/19/2017  3:17 PM. (History)          Review of Systems    Objective   Base Eye Exam     Visual Acuity (Snellen - Linear)      Right Left   Dist cc 20/25 20/20       Correction:  Glasses            Slit Lamp and Fundus Exam     External Exam      Right Left    External Normal Normal      Slit Lamp Exam      Right Left    Lids/Lashes Normal Normal    Conjunctiva/Sclera White and quiet White and quiet    Cornea Clear Clear    Anterior Chamber Deep and quiet Deep and quiet    Iris Round and reactive Round and reactive    Lens Clear Clear    Vitreous Normal Normal      Fundus Exam      Right Left    Disc Normal Normal, no palor    Macula Normal Normal    Vessels Normal Normal            Refraction     Wearing Rx      Sphere Cylinder Axis Add   Right -0.50  180 +2.00   Left -1.25 +1.50 153 +2.00               Hernandez Visual Field :   OD- rim artifact, ow full  OS- normal improved      Assessment/Plan   Diagnoses and all orders for this visit:    Cerebrovascular accident (CVA), unspecified mechanism    Visual field defect  Comments:  improved      Plan:     Discussed findings, f/u Ophth 6 months/prn

## 2017-06-20 ENCOUNTER — OFFICE VISIT (OUTPATIENT)
Dept: FAMILY MEDICINE CLINIC | Facility: CLINIC | Age: 57
End: 2017-06-20

## 2017-06-20 VITALS
SYSTOLIC BLOOD PRESSURE: 130 MMHG | DIASTOLIC BLOOD PRESSURE: 84 MMHG | WEIGHT: 132 LBS | BODY MASS INDEX: 23.39 KG/M2 | HEIGHT: 63 IN

## 2017-06-20 DIAGNOSIS — R56.9 SEIZURES (HCC): Chronic | ICD-10-CM

## 2017-06-20 DIAGNOSIS — G89.29 CHRONIC BILATERAL LOW BACK PAIN, WITH SCIATICA PRESENCE UNSPECIFIED: ICD-10-CM

## 2017-06-20 DIAGNOSIS — R51.9 INTRACTABLE EPISODIC HEADACHE, UNSPECIFIED HEADACHE TYPE: Primary | ICD-10-CM

## 2017-06-20 DIAGNOSIS — M54.5 CHRONIC BILATERAL LOW BACK PAIN, WITH SCIATICA PRESENCE UNSPECIFIED: ICD-10-CM

## 2017-06-20 PROCEDURE — 99213 OFFICE O/P EST LOW 20 MIN: CPT | Performed by: FAMILY MEDICINE

## 2017-06-20 RX ORDER — AMITRIPTYLINE HYDROCHLORIDE 10 MG/1
10 TABLET, FILM COATED ORAL NIGHTLY
Qty: 30 TABLET | Refills: 2 | Status: SHIPPED | OUTPATIENT
Start: 2017-06-20 | End: 2017-07-26

## 2017-06-20 NOTE — PROGRESS NOTES
Subjective   Cinthia Light is a 57 y.o. female.     History of Present Illness   Ms. Light is a 56yo female that presents today for follow-up on her seizures. She has been doing better with the new medication, but she is only taking one because she doesn't feel well.    She says that her headaches were really bad for awhile but they are starting to get better now that her stress is getting better. She has been taking benadryl and tylenol PRN headache and that does help.  She only takes when it is bad.  She has had a lot of issues with sleep because of low back pain. The tylenol isn't helping with that pain and she doesn't do well with pain medication.      Current Outpatient Prescriptions:   •  albuterol (PROVENTIL HFA;VENTOLIN HFA) 108 (90 BASE) MCG/ACT inhaler, Inhale 2 puffs Every 4 (Four) Hours As Needed for Wheezing., Disp: 1 inhaler, Rfl: 2  •  aspirin 325 MG tablet, Take 325 mg by mouth daily., Disp: , Rfl:   •  cholecalciferol (VITAMIN D3) 1000 UNITS tablet, Take 1,000 Units by mouth daily., Disp: , Rfl:   •  denosumab (PROLIA) 60 MG/ML solution syringe, Inject  under the skin Every 6 (Six) Months., Disp: , Rfl:   •  diphenhydrAMINE (BENADRYL) 25 mg capsule, Take 25 mg by mouth. 2 tablets in the morning,2 tablets at bedtime, Disp: , Rfl:   •  ethosuximide (ZARONTIN) 250 MG capsule, Take 1 capsule by mouth 2 (Two) Times a Day. (Patient taking differently: Take 250 mg by mouth Daily.), Disp: 60 capsule, Rfl: 2  •  furosemide (LASIX) 20 MG tablet, Take 1 tablet by mouth 2 (Two) Times a Day As Needed (swelling)., Disp: 60 tablet, Rfl: 2  •  lisinopril (PRINIVIL,ZESTRIL) 10 MG tablet, Take 1 tablet by mouth Daily., Disp: 90 tablet, Rfl: 2  •  omeprazole (priLOSEC) 40 MG capsule, TAKE ONE CAPSULE BY MOUTH DAILY, Disp: 30 capsule, Rfl: 5    The following portions of the patient's history were reviewed and updated as appropriate: allergies, current medications, past family history, past medical  "history, past social history, past surgical history and problem list.    Review of Systems   Constitutional: Positive for activity change, appetite change, fatigue and unexpected weight change.   Eyes: Negative for visual disturbance.   Respiratory: Negative for cough, shortness of breath and wheezing.    Cardiovascular: Negative for chest pain, palpitations and leg swelling.   Gastrointestinal: Negative for abdominal distention, abdominal pain, constipation, diarrhea, nausea and vomiting.   Musculoskeletal: Negative for arthralgias, back pain, gait problem and joint swelling.   Skin: Negative for pallor, rash and wound.   Neurological: Positive for headaches. Negative for seizures and weakness.   Psychiatric/Behavioral: Positive for dysphoric mood. Negative for sleep disturbance. The patient is nervous/anxious.        Objective    Vitals:    06/20/17 1403   BP: 130/84   Weight: 132 lb (59.9 kg)   Height: 63\" (160 cm)       Physical Exam   Constitutional: She is oriented to person, place, and time. She appears well-developed and well-nourished. No distress.   Cardiovascular: Normal rate, regular rhythm and normal heart sounds.    No murmur heard.  No LE edema.   Pulmonary/Chest: Effort normal and breath sounds normal. No respiratory distress.   Abdominal: Soft. Bowel sounds are normal. She exhibits no distension. There is no tenderness.   Neurological: She is alert and oriented to person, place, and time.   Psychiatric: She has a normal mood and affect. Her behavior is normal. Judgment and thought content normal.   Nursing note and vitals reviewed.      Assessment/Plan   Problems Addressed this Visit        Nervous and Auditory    Seizures      Other Visit Diagnoses     Intractable episodic headache, unspecified headache type    -  Primary    Chronic bilateral low back pain, with sciatica presence unspecified            1.) Headaches- She thinks that these are stress related. They have already started to get better. " Things are getting more settled in her life.  Will continue Tylenol with benadryl PRN.  2.) Seizures-  Doing well on current medication even taking the lower amount. Will recheck labs at next appointment.  3.) Back Pain-  This has been an on-going issue and she is unable to take most pain medication.  Will continue heating pad and may consider topical NSAIDS in the future.    RTC in 1 month or sooner PRN

## 2017-07-07 RX ORDER — OMEPRAZOLE 40 MG/1
CAPSULE, DELAYED RELEASE ORAL
Qty: 30 CAPSULE | Refills: 4 | Status: SHIPPED | OUTPATIENT
Start: 2017-07-07 | End: 2017-11-27 | Stop reason: SDUPTHER

## 2017-07-26 ENCOUNTER — OFFICE VISIT (OUTPATIENT)
Dept: FAMILY MEDICINE CLINIC | Facility: CLINIC | Age: 57
End: 2017-07-26

## 2017-07-26 ENCOUNTER — APPOINTMENT (OUTPATIENT)
Dept: LAB | Facility: HOSPITAL | Age: 57
End: 2017-07-26

## 2017-07-26 VITALS
WEIGHT: 134.8 LBS | DIASTOLIC BLOOD PRESSURE: 86 MMHG | HEIGHT: 63 IN | SYSTOLIC BLOOD PRESSURE: 130 MMHG | BODY MASS INDEX: 23.88 KG/M2

## 2017-07-26 DIAGNOSIS — M54.5 LOW BACK PAIN, UNSPECIFIED BACK PAIN LATERALITY, UNSPECIFIED CHRONICITY, WITH SCIATICA PRESENCE UNSPECIFIED: ICD-10-CM

## 2017-07-26 DIAGNOSIS — Z12.31 ENCOUNTER FOR SCREENING MAMMOGRAM FOR MALIGNANT NEOPLASM OF BREAST: ICD-10-CM

## 2017-07-26 DIAGNOSIS — R30.0 DYSURIA: ICD-10-CM

## 2017-07-26 DIAGNOSIS — R10.13 EPIGASTRIC PAIN: Primary | ICD-10-CM

## 2017-07-26 DIAGNOSIS — E16.2 HYPOGLYCEMIA: ICD-10-CM

## 2017-07-26 DIAGNOSIS — Z79.899 HIGH RISK MEDICATION USE: ICD-10-CM

## 2017-07-26 LAB
ALBUMIN SERPL-MCNC: 4.2 G/DL (ref 3.4–4.8)
ALBUMIN/GLOB SERPL: 1.6 G/DL (ref 1.1–1.8)
ALP SERPL-CCNC: 75 U/L (ref 38–126)
ALT SERPL W P-5'-P-CCNC: 30 U/L (ref 9–52)
AMYLASE SERPL-CCNC: 98 U/L (ref 50–130)
ANION GAP SERPL CALCULATED.3IONS-SCNC: 9 MMOL/L (ref 5–15)
ARTICHOKE IGE QN: 83 MG/DL (ref 1–129)
AST SERPL-CCNC: 28 U/L (ref 14–36)
BILIRUB BLD-MCNC: NEGATIVE MG/DL
BILIRUB SERPL-MCNC: 0.2 MG/DL (ref 0.2–1.3)
BUN BLD-MCNC: 20 MG/DL (ref 7–21)
BUN/CREAT SERPL: 20.4 (ref 7–25)
CALCIUM SPEC-SCNC: 9.9 MG/DL (ref 8.4–10.2)
CHLORIDE SERPL-SCNC: 102 MMOL/L (ref 95–110)
CHOLEST SERPL-MCNC: 170 MG/DL (ref 0–199)
CLARITY, POC: CLEAR
CO2 SERPL-SCNC: 27 MMOL/L (ref 22–31)
COLOR UR: YELLOW
CREAT BLD-MCNC: 0.98 MG/DL (ref 0.5–1)
DEPRECATED RDW RBC AUTO: 45.9 FL (ref 36.4–46.3)
ERYTHROCYTE [DISTWIDTH] IN BLOOD BY AUTOMATED COUNT: 14.7 % (ref 11.5–14.5)
GFR SERPL CREATININE-BSD FRML MDRD: 58 ML/MIN/1.73 (ref 51–120)
GLOBULIN UR ELPH-MCNC: 2.7 GM/DL (ref 2.3–3.5)
GLUCOSE BLD-MCNC: 100 MG/DL (ref 60–100)
GLUCOSE UR STRIP-MCNC: NEGATIVE MG/DL
HBA1C MFR BLD: 6.12 % (ref 4–5.6)
HCT VFR BLD AUTO: 36.4 % (ref 35–45)
HDLC SERPL-MCNC: 78 MG/DL (ref 60–200)
HGB BLD-MCNC: 11.9 G/DL (ref 12–15.5)
KETONES UR QL: NEGATIVE
LDLC/HDLC SERPL: 0.99 {RATIO} (ref 0–3.22)
LEUKOCYTE EST, POC: ABNORMAL
LIPASE SERPL-CCNC: 159 U/L (ref 23–300)
MCH RBC QN AUTO: 27.8 PG (ref 26.5–34)
MCHC RBC AUTO-ENTMCNC: 32.7 G/DL (ref 31.4–36)
MCV RBC AUTO: 85 FL (ref 80–98)
NITRITE UR-MCNC: NEGATIVE MG/ML
PH UR: 7.5 [PH] (ref 5–8)
PLATELET # BLD AUTO: 336 10*3/MM3 (ref 150–450)
PMV BLD AUTO: 10.9 FL (ref 8–12)
POTASSIUM BLD-SCNC: 4.7 MMOL/L (ref 3.5–5.1)
PROT SERPL-MCNC: 6.9 G/DL (ref 6.3–8.6)
PROT UR STRIP-MCNC: ABNORMAL MG/DL
RBC # BLD AUTO: 4.28 10*6/MM3 (ref 3.77–5.16)
RBC # UR STRIP: ABNORMAL /UL
SODIUM BLD-SCNC: 138 MMOL/L (ref 137–145)
SP GR UR: 1 (ref 1–1.03)
TRIGL SERPL-MCNC: 75 MG/DL (ref 20–199)
UROBILINOGEN UR QL: NORMAL
WBC NRBC COR # BLD: 6.2 10*3/MM3 (ref 3.2–9.8)

## 2017-07-26 PROCEDURE — 80061 LIPID PANEL: CPT | Performed by: FAMILY MEDICINE

## 2017-07-26 PROCEDURE — 83525 ASSAY OF INSULIN: CPT | Performed by: FAMILY MEDICINE

## 2017-07-26 PROCEDURE — 83036 HEMOGLOBIN GLYCOSYLATED A1C: CPT | Performed by: FAMILY MEDICINE

## 2017-07-26 PROCEDURE — 84681 ASSAY OF C-PEPTIDE: CPT | Performed by: FAMILY MEDICINE

## 2017-07-26 PROCEDURE — 83527 ASSAY OF INSULIN: CPT | Performed by: FAMILY MEDICINE

## 2017-07-26 PROCEDURE — 85027 COMPLETE CBC AUTOMATED: CPT | Performed by: FAMILY MEDICINE

## 2017-07-26 PROCEDURE — 82150 ASSAY OF AMYLASE: CPT | Performed by: FAMILY MEDICINE

## 2017-07-26 PROCEDURE — 81002 URINALYSIS NONAUTO W/O SCOPE: CPT | Performed by: FAMILY MEDICINE

## 2017-07-26 PROCEDURE — 80053 COMPREHEN METABOLIC PANEL: CPT | Performed by: FAMILY MEDICINE

## 2017-07-26 PROCEDURE — 83690 ASSAY OF LIPASE: CPT | Performed by: FAMILY MEDICINE

## 2017-07-26 PROCEDURE — 99214 OFFICE O/P EST MOD 30 MIN: CPT | Performed by: FAMILY MEDICINE

## 2017-07-26 PROCEDURE — 36415 COLL VENOUS BLD VENIPUNCTURE: CPT | Performed by: FAMILY MEDICINE

## 2017-07-26 PROCEDURE — 80168 ASSAY OF ETHOSUXIMIDE: CPT | Performed by: FAMILY MEDICINE

## 2017-07-27 LAB — C PEPTIDE SERPL-MCNC: 3 NG/ML (ref 1.1–4.4)

## 2017-07-31 ENCOUNTER — APPOINTMENT (OUTPATIENT)
Dept: LAB | Facility: HOSPITAL | Age: 57
End: 2017-07-31

## 2017-07-31 LAB — ETHOSUXIMIDE SERPL-MCNC: 22 UG/ML (ref 40–100)

## 2017-07-31 PROCEDURE — 83527 ASSAY OF INSULIN: CPT | Performed by: FAMILY MEDICINE

## 2017-07-31 PROCEDURE — 83525 ASSAY OF INSULIN: CPT | Performed by: FAMILY MEDICINE

## 2017-07-31 PROCEDURE — 36415 COLL VENOUS BLD VENIPUNCTURE: CPT | Performed by: FAMILY MEDICINE

## 2017-08-06 LAB
INSULIN FREE SERPL-ACNC: 3.9 UU/ML
INSULIN SERPL-ACNC: 4.3 UU/ML

## 2017-09-11 DIAGNOSIS — G40.909 SEIZURE DISORDER (HCC): ICD-10-CM

## 2017-09-11 RX ORDER — ETHOSUXIMIDE 250 MG/1
CAPSULE ORAL
Qty: 60 CAPSULE | Refills: 1 | Status: SHIPPED | OUTPATIENT
Start: 2017-09-11 | End: 2017-11-27 | Stop reason: SDUPTHER

## 2017-09-26 ENCOUNTER — OFFICE VISIT (OUTPATIENT)
Dept: FAMILY MEDICINE CLINIC | Facility: CLINIC | Age: 57
End: 2017-09-26

## 2017-09-26 VITALS
SYSTOLIC BLOOD PRESSURE: 102 MMHG | BODY MASS INDEX: 24.17 KG/M2 | DIASTOLIC BLOOD PRESSURE: 78 MMHG | HEIGHT: 63 IN | WEIGHT: 136.4 LBS

## 2017-09-26 DIAGNOSIS — S01.332A COMPLICATION OF EAR PIERCING, LEFT, INITIAL ENCOUNTER: ICD-10-CM

## 2017-09-26 DIAGNOSIS — R10.13 EPIGASTRIC PAIN: ICD-10-CM

## 2017-09-26 DIAGNOSIS — G40.909 SEIZURE DISORDER (HCC): Primary | ICD-10-CM

## 2017-09-26 PROCEDURE — 99213 OFFICE O/P EST LOW 20 MIN: CPT | Performed by: FAMILY MEDICINE

## 2017-09-26 NOTE — PROGRESS NOTES
"Subjective   Cinthia Light is a 57 y.o. female.     History of Present Illness   Ms. Light is a 58yo female that presents today for follow-up on her seizure disorder.  She has been taking the zarontin and tolerating that. She has had a lot of issues with medications in the past and this one seems to be the best tolerated.  She hasn't had issues and hasn't had seizures.  She has had issues with her left ear. She has had some clear drainage from that ear.  She says that if she doesn't wear an ear ring, it closes up. She has had to push it through several times.  She hasn't had this issue in the past. She says that she was bit by something on that ear in the past.  She says that was when the issue started.  She has been having some abd pain for months now. She was concerned that her \"cancer may be back\" she says that she doesn't want to know.  She has had nausea and pain for over a month now. Comes and goes.  She has had decrease in appetite.      Current Outpatient Prescriptions:   •  albuterol (PROVENTIL HFA;VENTOLIN HFA) 108 (90 BASE) MCG/ACT inhaler, Inhale 2 puffs Every 4 (Four) Hours As Needed for Wheezing., Disp: 1 inhaler, Rfl: 2  •  aspirin 325 MG tablet, Take 325 mg by mouth daily., Disp: , Rfl:   •  cholecalciferol (VITAMIN D3) 1000 UNITS tablet, Take 1,000 Units by mouth daily., Disp: , Rfl:   •  denosumab (PROLIA) 60 MG/ML solution syringe, Inject  under the skin Every 6 (Six) Months., Disp: , Rfl:   •  diphenhydrAMINE (BENADRYL) 25 mg capsule, Take 25 mg by mouth. 2 tablets in the morning,2 tablets at bedtime, Disp: , Rfl:   •  ethosuximide (ZARONTIN) 250 MG capsule, TAKE ONE CAPSULE BY MOUTH TWICE A DAY, Disp: 60 capsule, Rfl: 1  •  furosemide (LASIX) 20 MG tablet, Take 1 tablet by mouth 2 (Two) Times a Day As Needed (swelling)., Disp: 60 tablet, Rfl: 2  •  lisinopril (PRINIVIL,ZESTRIL) 10 MG tablet, Take 1 tablet by mouth Daily., Disp: 90 tablet, Rfl: 2  •  omeprazole (priLOSEC) 40 MG " "capsule, TAKE ONE CAPSULE BY MOUTH DAILY, Disp: 30 capsule, Rfl: 4    The following portions of the patient's history were reviewed and updated as appropriate: allergies, current medications, past family history, past medical history, past social history, past surgical history and problem list.    Review of Systems   Constitutional: Positive for activity change and fatigue. Negative for appetite change and unexpected weight change.   Eyes: Negative for visual disturbance.   Respiratory: Negative for cough, shortness of breath and wheezing.    Cardiovascular: Negative for chest pain, palpitations and leg swelling.   Gastrointestinal: Positive for abdominal pain and nausea. Negative for abdominal distention, constipation, diarrhea and vomiting.   Musculoskeletal: Positive for arthralgias, back pain and gait problem. Negative for joint swelling.   Skin: Positive for wound. Negative for pallor and rash.   Neurological: Positive for weakness. Negative for seizures and headaches.   Psychiatric/Behavioral: Negative for dysphoric mood and sleep disturbance. The patient is not nervous/anxious.        Objective    Vitals:    09/26/17 1139   BP: 102/78   Weight: 136 lb 6.4 oz (61.9 kg)   Height: 63\" (160 cm)       Physical Exam   Constitutional: She is oriented to person, place, and time. She appears well-developed and well-nourished. No distress.   Cardiovascular: Normal rate, regular rhythm and normal heart sounds.    No murmur heard.  No LE edema.   Pulmonary/Chest: Effort normal and breath sounds normal. No respiratory distress.   Abdominal: Soft. Bowel sounds are normal. She exhibits no distension. There is tenderness in the epigastric area. There is guarding. There is no rigidity, no rebound and no CVA tenderness.   Neurological: She is alert and oriented to person, place, and time.   Skin:   Ear ring hole on the left ear appears slightly swollen and inflammed.   Psychiatric: She has a normal mood and affect. Her " behavior is normal. Judgment and thought content normal.   Nursing note and vitals reviewed.      Assessment/Plan   Problems Addressed this Visit        Nervous and Auditory    Seizure disorder - Primary      Other Visit Diagnoses     Complication of ear piercing, left, initial encounter        Epigastric pain            1.) Seizure D/O-  Doing well on current medications. Not having side effects or seizures on the lower dose. Will recheck levels next time that he is in.  2.) Complication of piercing-  Will try bacitracin and see if that helps. Doesn't appear to be infected, but may have some superficial infection.  3.) Epigastric pain-  Recommended that we consider US, but she doesn't want that. Says that she doesn't want to know if something is wrong. Stressed that if her issues get worse will push her more to have imaging.  RTC in 2 months or sooner PRN

## 2017-09-29 ENCOUNTER — TELEPHONE (OUTPATIENT)
Dept: FAMILY MEDICINE CLINIC | Facility: CLINIC | Age: 57
End: 2017-09-29

## 2017-09-29 NOTE — TELEPHONE ENCOUNTER
Called pt. Number on file. There was no answer and no way to leave message regarding prescription from Dr. Robison

## 2017-09-29 NOTE — TELEPHONE ENCOUNTER
[facility] SAID THEY LEFT A MESSAGE ON TUESDAY FOR [facility]..   PRESP FOR LIDOCAINE-BENZLKONIUM IS NOT AVAILABLE ANYMORE..THEY NEED TO KNOW IF  WANTS TO CHANGE TO SOMETHING ELSE?

## 2017-11-27 ENCOUNTER — TELEPHONE (OUTPATIENT)
Dept: FAMILY MEDICINE CLINIC | Facility: CLINIC | Age: 57
End: 2017-11-27

## 2017-11-27 ENCOUNTER — OFFICE VISIT (OUTPATIENT)
Dept: FAMILY MEDICINE CLINIC | Facility: CLINIC | Age: 57
End: 2017-11-27

## 2017-11-27 VITALS
HEIGHT: 63 IN | WEIGHT: 141.8 LBS | SYSTOLIC BLOOD PRESSURE: 146 MMHG | DIASTOLIC BLOOD PRESSURE: 92 MMHG | BODY MASS INDEX: 25.12 KG/M2

## 2017-11-27 DIAGNOSIS — G40.909 SEIZURE DISORDER (HCC): ICD-10-CM

## 2017-11-27 DIAGNOSIS — R60.9 SWELLING: ICD-10-CM

## 2017-11-27 DIAGNOSIS — M19.90 OSTEOARTHRITIS, UNSPECIFIED OSTEOARTHRITIS TYPE, UNSPECIFIED SITE: Primary | ICD-10-CM

## 2017-11-27 PROCEDURE — 99213 OFFICE O/P EST LOW 20 MIN: CPT | Performed by: FAMILY MEDICINE

## 2017-11-27 PROCEDURE — 96372 THER/PROPH/DIAG INJ SC/IM: CPT | Performed by: FAMILY MEDICINE

## 2017-11-27 RX ORDER — DIPHENHYDRAMINE HCL 25 MG
25 CAPSULE ORAL EVERY 6 HOURS PRN
Qty: 120 CAPSULE | Refills: 2 | Status: SHIPPED | OUTPATIENT
Start: 2017-11-27 | End: 2017-11-28

## 2017-11-27 RX ORDER — OMEPRAZOLE 40 MG/1
40 CAPSULE, DELAYED RELEASE ORAL DAILY
Qty: 30 CAPSULE | Refills: 4 | Status: SHIPPED | OUTPATIENT
Start: 2017-11-27 | End: 2018-05-07 | Stop reason: SDUPTHER

## 2017-11-27 RX ORDER — LISINOPRIL 10 MG/1
10 TABLET ORAL DAILY
Qty: 90 TABLET | Refills: 2 | Status: SHIPPED | OUTPATIENT
Start: 2017-11-27 | End: 2018-05-07 | Stop reason: SDUPTHER

## 2017-11-27 RX ORDER — TRIAMCINOLONE ACETONIDE 40 MG/ML
80 INJECTION, SUSPENSION INTRA-ARTICULAR; INTRAMUSCULAR ONCE
Status: COMPLETED | OUTPATIENT
Start: 2017-11-27 | End: 2017-11-27

## 2017-11-27 RX ORDER — FUROSEMIDE 20 MG/1
20 TABLET ORAL 2 TIMES DAILY PRN
Qty: 60 TABLET | Refills: 2 | Status: SHIPPED | OUTPATIENT
Start: 2017-11-27 | End: 2018-05-07 | Stop reason: SDUPTHER

## 2017-11-27 RX ORDER — ETHOSUXIMIDE 250 MG/1
250 CAPSULE ORAL 2 TIMES DAILY
Qty: 180 CAPSULE | Refills: 1 | Status: SHIPPED | OUTPATIENT
Start: 2017-11-27 | End: 2018-01-11 | Stop reason: SINTOL

## 2017-11-27 RX ADMIN — TRIAMCINOLONE ACETONIDE 80 MG: 40 INJECTION, SUSPENSION INTRA-ARTICULAR; INTRAMUSCULAR at 13:27

## 2017-11-27 NOTE — PATIENT INSTRUCTIONS
Neck Exercises  Neck exercises can be important for many reasons:   · They can help you to improve and maintain flexibility in your neck. This can be especially important as you age.  · They can help to make your neck stronger. This can make movement easier.  · They can reduce or prevent neck pain.  · They may help your upper back.  Ask your health care provider which neck exercises would be best for you.  EXERCISES TO IMPROVE NECK FLEXIBILITY  Neck Stretch  Repeat this exercise 3-5 times.   1. Do this exercise while standing or while sitting in a chair.  2. Place your feet flat on the floor, shoulder-width apart.  3. Slowly turn your head to the right. Turn it all the way to the right so you can look over your right shoulder. Do not tilt or tip your head.  4. Hold this position for 10-30 seconds.  5. Slowly turn your head to the left, to look over your left shoulder.  6. Hold this position for 10-30 seconds.  Neck Retraction  Repeat this exercise 8-10 times. Do this 3-4 times a day or as told by your health care provider.  1. Do this exercise while standing or while sitting in a sturdy chair.  2. Look straight ahead. Do not bend your neck.  3. Use your fingers to push your chin backward. Do not bend your neck for this movement. Continue to face straight ahead. If you are doing the exercise properly, you will feel a slight sensation in your throat and a stretch at the back of your neck.  4. Hold the stretch for 1-2 seconds. Relax and repeat.  EXERCISES TO IMPROVE NECK STRENGTH  Neck Press   Repeat this exercise 10 times. Do it first thing in the morning and right before bed or as told by your health care provider.  1. Lie on your back on a firm bed or on the floor with a pillow under your head.  2. Use your neck muscles to push your head down on the pillow and straighten your spine.  3. Hold the position as well as you can. Keep your head facing up and your chin tucked.  4. Slowly count to 5 while holding this  position.  5. Relax for a few seconds. Then repeat.  Isometric Strengthening  Do a full set of these exercises 2 times a day or as told by your health care provider.  1. Sit in a supportive chair and place your hand on your forehead.  2. Push forward with your head and neck while pushing back with your hand. Hold for 10 seconds.  3. Relax. Then repeat the exercise 3 times.  4. Next, do the sequence again, this time putting your hand against the back of your head. Use your head and neck to push backward against the hand pressure.  5. Finally, do the same exercise on either side of your head, pushing sideways against the pressure of your hand.  Prone Head Lifts  Repeat this exercise 5 times. Do this 2 times a day or as told by your health care provider.  1. Lie face-down, resting on your elbows so that your chest and upper back are raised.  2. Start with your head facing downward, near your chest. Position your chin either on or near your chest.  3. Slowly lift your head upward. Lift until you are looking straight ahead. Then continue lifting your head as far back as you can stretch.  4. Hold your head up for 5 seconds. Then slowly lower it to your starting position.  Supine Head Lifts  Repeat this exercise 8-10 times. Do this 2 times a day or as told by your health care provider.  1. Lie on your back, bending your knees to point to the ceiling and keeping your feet flat on the floor.  2. Lift your head slowly off the floor, raising your chin toward your chest.  3. Hold for 5 seconds.  4. Relax and repeat.  Scapular Retraction  Repeat this exercise 5 times. Do this 2 times a day or as told by your health care provider.   1. Stand with your arms at your sides. Look straight ahead.  2. Slowly pull both shoulders backward and downward until you feel a stretch between your shoulder blades in your upper back.  3. Hold for 10-30 seconds.  4. Relax and repeat.  SEEK MEDICAL CARE IF:  · Your neck pain or discomfort gets much  worse when you do an exercise.  · Your neck pain or discomfort does not improve within 2 hours after you exercise.  If you have any of these problems, stop exercising right away. Do not do the exercises again unless your health care provider says that you can.  SEEK IMMEDIATE MEDICAL CARE IF:  · You develop sudden, severe neck pain. If this happens, stop exercising right away. Do not do the exercises again unless your health care provider says that you can.     This information is not intended to replace advice given to you by your health care provider. Make sure you discuss any questions you have with your health care provider.     Document Released: 11/28/2016 Document Reviewed: 11/28/2016  [employer] Interactive Patient Education ©2017 [employer]

## 2017-11-27 NOTE — PROGRESS NOTES
Subjective   Cinthia Light is a 57 y.o. female.     History of Present Illness   Ms. Light is a 58yo female with PMH of seizure disorder that presents today for issues with neck, back, and joint pain. She says that it has been getting worse since the cold weather has come.  She has been having more swelling.  She has had issues with a lot of medications in the past.  She hasn't had any accidents, falls, or done any heavy lifitng.  She can't take a lot of medications for pain. Has tolerated a steroid injection in the past and that helped her arthritis when it flaired up.      She has been on zarontin and that had been working and she was tolerating the medication without issues, but she has started to have some issues with rash on her back. She isn't sure if that is related to that medication. She says that her pills have appeared to be darker and she thinks that it may be the dye.      Current Outpatient Prescriptions:   •  albuterol (PROVENTIL HFA;VENTOLIN HFA) 108 (90 BASE) MCG/ACT inhaler, Inhale 2 puffs Every 4 (Four) Hours As Needed for Wheezing., Disp: 1 inhaler, Rfl: 2  •  aspirin 325 MG tablet, Take 325 mg by mouth daily., Disp: , Rfl:   •  cholecalciferol (VITAMIN D3) 1000 UNITS tablet, Take 1,000 Units by mouth daily., Disp: , Rfl:   •  denosumab (PROLIA) 60 MG/ML solution syringe, Inject  under the skin Every 6 (Six) Months., Disp: , Rfl:   •  diphenhydrAMINE (BENADRYL) 25 mg capsule, Take 25 mg by mouth. 2 tablets in the morning,2 tablets at bedtime, Disp: , Rfl:   •  ethosuximide (ZARONTIN) 250 MG capsule, TAKE ONE CAPSULE BY MOUTH TWICE A DAY, Disp: 60 capsule, Rfl: 1  •  furosemide (LASIX) 20 MG tablet, Take 1 tablet by mouth 2 (Two) Times a Day As Needed (swelling)., Disp: 60 tablet, Rfl: 2  •  Lidocaine-Benzalkonium 2.5-0.13 % liquid, Apply 1 application topically 2 (Two) Times a Day., Disp: 118 mL, Rfl: 0  •  lisinopril (PRINIVIL,ZESTRIL) 10 MG tablet, Take 1 tablet by mouth Daily.,  "Disp: 90 tablet, Rfl: 2  •  omeprazole (priLOSEC) 40 MG capsule, TAKE ONE CAPSULE BY MOUTH DAILY, Disp: 30 capsule, Rfl: 4    The following portions of the patient's history were reviewed and updated as appropriate: allergies, current medications, past family history, past medical history, past social history, past surgical history and problem list.    Review of Systems   Constitutional: Positive for activity change and fatigue. Negative for appetite change and unexpected weight change.   Eyes: Negative for visual disturbance.   Respiratory: Negative for cough, shortness of breath and wheezing.    Cardiovascular: Negative for chest pain, palpitations and leg swelling.   Gastrointestinal: Negative for abdominal distention, abdominal pain, constipation, diarrhea, nausea and vomiting.   Musculoskeletal: Positive for arthralgias, back pain, gait problem, myalgias, neck pain and neck stiffness. Negative for joint swelling.   Skin: Positive for rash. Negative for pallor and wound.   Neurological: Positive for weakness. Negative for seizures and headaches.   Psychiatric/Behavioral: Negative for dysphoric mood and sleep disturbance. The patient is not nervous/anxious.        Objective    Vitals:    11/27/17 1143   BP: 146/92   Weight: 141 lb 12.8 oz (64.3 kg)   Height: 63\" (160 cm)       Physical Exam   Constitutional: She is oriented to person, place, and time. She appears well-developed and well-nourished. No distress.   Cardiovascular: Normal rate, regular rhythm and normal heart sounds.    No murmur heard.  No LE edema.   Pulmonary/Chest: Effort normal and breath sounds normal. No respiratory distress.   Abdominal: Soft. Bowel sounds are normal. She exhibits no distension. There is no tenderness.   Musculoskeletal:        Cervical back: She exhibits tenderness, pain and spasm. She exhibits normal range of motion.   Neurological: She is alert and oriented to person, place, and time.   Skin:        Psychiatric: She has a " normal mood and affect. Her behavior is normal. Judgment and thought content normal.   Nursing note and vitals reviewed.      Assessment/Plan   Problems Addressed this Visit        Nervous and Auditory    Seizure disorder    Relevant Medications    ethosuximide (ZARONTIN) 250 MG capsule      Other Visit Diagnoses     Osteoarthritis, unspecified osteoarthritis type, unspecified site    -  Primary    Relevant Medications    triamcinolone acetonide (KENALOG-40) injection 80 mg (Completed)    Swelling        Relevant Medications    furosemide (LASIX) 20 MG tablet        1.) OA-  She can't take most medications because of her allergies. She has been to Pain Management and there was nothing that she could tolerate.  Will give kenalog and see if that helps with inflammation. She has tolerated that in the past.    If not better when she comes back in, will get new imaging.  2.) Seizure D/O- Medication seems to be working well for her.  Has had some rash and she thinks that is because her medication has changed because they are darker and likely have more dye. She is very sensitive to that with medications. She will discuss with her pharmacy and if they can't change back will try to find somewhere else.  RTC in 2 months or sooner PRN

## 2017-11-28 RX ORDER — DIPHENHYDRAMINE HCL 25 MG
25 TABLET ORAL EVERY 6 HOURS PRN
Qty: 60 TABLET | Refills: 2 | Status: SHIPPED | OUTPATIENT
Start: 2017-11-28 | End: 2018-05-07 | Stop reason: SDUPTHER

## 2018-01-09 ENCOUNTER — OFFICE VISIT (OUTPATIENT)
Dept: CARDIOLOGY | Facility: CLINIC | Age: 58
End: 2018-01-09

## 2018-01-09 VITALS
HEIGHT: 63 IN | HEART RATE: 78 BPM | WEIGHT: 137 LBS | DIASTOLIC BLOOD PRESSURE: 72 MMHG | SYSTOLIC BLOOD PRESSURE: 120 MMHG | BODY MASS INDEX: 24.27 KG/M2

## 2018-01-09 DIAGNOSIS — R00.2 PALPITATIONS: ICD-10-CM

## 2018-01-09 DIAGNOSIS — I47.1 PAROXYSMAL SUPRAVENTRICULAR TACHYCARDIA (HCC): Primary | ICD-10-CM

## 2018-01-09 DIAGNOSIS — I10 ESSENTIAL HYPERTENSION: ICD-10-CM

## 2018-01-09 PROCEDURE — 99213 OFFICE O/P EST LOW 20 MIN: CPT | Performed by: INTERNAL MEDICINE

## 2018-01-09 NOTE — PROGRESS NOTES
[facility]  OFFICE NOTE    Cinthia Light  57 y.o. female    01/09/2018  1. Paroxysmal supraventricular tachycardia    2. Essential hypertension    3. Palpitations        Chief complaint -Follow-up PSVT      History of present Illness- 57-year-old lady who has history of paradoxical embolism and stroke subsequently had PFO closure in the past, has history of seizure disorder on seizure medications.  She has been having occasional chest pain she has history of hiatal hernia and had surgery for hiatal hernia and there was a mesh which has a defect of the diaphragm and use her chronic pain.  She did have stricture of the esophagus initially when she had the hiatal hernia surgery with difficulty swallowing subsequently they had to release that.  She has chronic epigastric discomfort.  She is on proton pump inhibitors.              Allergies   Allergen Reactions   • Acetaminophen    • Adhesive Tape    • Baclofen    • Bee Pollen    • Compazine [Prochlorperazine Edisylate]    • Demerol [Meperidine]    • Etodolac    • Lamictal [Lamotrigine]    • Latex    • Lortab [Hydrocodone-Acetaminophen]    • Milk-Related Compounds    • Morphine    • Morphine And Related    • Novocain [Procaine]    • Other      Paper tape, Bee/wasp sting, muscle relaxers   • Prochlorperazine    • Promethazine    • Ranitidine Hcl    • Reglan [Metoclopramide]    • Sulfa Antibiotics    • Tramadol    • Valium [Diazepam]    • Amitriptyline Rash         Past Medical History:   Diagnosis Date   • Abnormal findings on diagnostic imaging of liver     Abnormal findings on diagnostic imaging of liver and biliary tract     • Allergic rhinitis    • Backache     Backache - stress fracture     • Breast cyst    • Chronic pain    • Chronic pancreatitis    • CVA (cerebral vascular accident)    • Diabetes mellitus associated with pancreatic disease    • Disease of esophagus, unspecified    • Diverticulitis of colon    • Dysphagia    •  Epilepsy    • Esophagitis     Esophagitis - grade III      • Essential hypertension    • Fibrocystic breast    • Gastroparesis     Gastroparesis syndrome - 544min      • GERD (gastroesophageal reflux disease) 07/27/2015    Gastroesophageal reflux disease   • Hemorrhoids     Hemorrhoids - internal & external    • Hiatal hernia    • Hyperlipidemia    • Insomnia    • Irritable bowel syndrome    • Keratoconjunctivitis sicca    • Left upper quadrant pain     Left upper quadrant pain - ? partial pancreas divisum on MRCP 9/17/13      • Low back pain    • Mass of chest wall    • Nausea and vomiting    • Neck pain    • Non-diabetic hypoglycemia    • Old myocardial infarction    • Osteoporosis    • Palpitations    • Paroxysmal supraventricular tachycardia    • Primary hyperparathyroidism    • Restless legs    • Secondary hyperparathyroidism    • Seizure disorder    • SOB (shortness of breath)    • Vaginal discharge     Offensive vaginal discharge      • Vitamin deficiency     Vitamin deficiency - D   • Vitreous floaters          Past Surgical History:   Procedure Laterality Date   • BREAST CYST ASPIRATION     • CARDIAC CATHETERIZATION     • CARPAL TUNNEL RELEASE  11/05/1990    Left carpal tunnel release   • ENDOSCOPY  07/31/2013    Colon endoscopy 60205 (2)-Diverticulum in sigmoid colon, descending colon, & transverse colon. Internal & external hemorrhoids found.   • ENDOSCOPY  10/22/2014    EGD w/ biopsy 97179 (1) - A hiatus hernia was found in the esophagus. Esophagitis seen. Biopsy taken. Food was found in the body of the stomach   • ENDOSCOPY  07/31/2013    EGD w/ tube 93153 (3)-Esophagitis seen. Biopsy taken. Hiatus hernia in esophagus. Gastritis in stomach. Biopsy taken. Normal dudoenum. Biopsy taken   • FOREARM SURGERY      Forearm or wrist surgery (1) - excision of ganglion cyst, left wrist.Ganglion cyst,volar aspect, left wrist   • GTUBE INSERTION  04/26/2007    gastrostomy tube, jehunostomy tube & intraoperative  reduction of abdominal contents into the peritoneal cavity. Paraesophageal hiatal hernia   • INJECTION OF MEDICATION  06/20/2016    Kenalog (1)   • INJECTION OF MEDICATION  07/28/2015    Toradol (1)   • LAPAROSCOPY ESOPHAGOGASTRIC FUNDOPLASTY HYBRID     • MAMMO SCREENING UNILAT DIGITAL  03/18/2014    SCREENING MAMMOGRAPHY DIGITAL  (Medicare) (1)   • OTHER SURGICAL HISTORY  08/09/1990    Ear surgery (1)-Excision of cyst from left post auricular area   • OTHER SURGICAL HISTORY  04/27/2015    ERCP 05401 (2) - Limited exam of esophagus, stomach, and duodenum were normal. Pancreatic divisum. Pancreatic stricture at the minor ampulla.Advantix stent placement. Performed at [facility].   • OTHER SURGICAL HISTORY  11/27/2002    Remove lymph nodes, neck (1)- Excision of emlarged cervical lymph node. Subcutaneous mass, right postauricular neck. Enlarged cervical lymph node, postauricular area of the neck   • OTHER SURGICAL HISTORY  03/2006    Repair of heart defect (1) - Repair of hole in heart Done @ [facility]   • PROCEDURE GENERIC CONVERTED  10/08/2015    Destruction of Benign Lesion (1-14) 07465 (1)   • PROCEDURE GENERIC CONVERTED  12/18/2014    ESOPHAGUS MOTILITY STUDY 97157 (1)   • PROCEDURE GENERIC CONVERTED  01/07/2003    REMOVE LESION NECK/CHEST 71196 (1) - Excision of deep soft tissue tumor, posterior neck & upper back.   • THORACOTOMY  04/26/2007    Redo L thoracotomy, partial lung decoration, repair of paraesophageal hernia(Alloderm graft), placement of Marcaine infusion catheters & system, gastrostomy, feeding jejunostomy.   • TOTAL ABDOMINAL HYSTERECTOMY  03/24/2000    Exam under anesthesia & a ANDRE w/ anterior repair. Ureterovaginal prolapse w/ stress urinary incontinence, specially a 2+cystocele & 3+ uterine prolapse   • TUBAL ABDOMINAL LIGATION  03/19/1986    postpartum tubal ligation.         Family History   Problem Relation Age of Onset   • Hypertension Mother    • Cancer Father    •  Lung cancer Father    • Diabetes Maternal Grandmother    • Ovarian cancer Maternal Grandmother    • Heart disease Other    • Stroke Other    • Diabetes Other    • Glaucoma Maternal Uncle    • Ovarian cancer Maternal Aunt          Social History     Social History   • Marital status:      Spouse name: N/A   • Number of children: N/A   • Years of education: N/A     Occupational History   • Not on file.     Social History Main Topics   • Smoking status: Never Smoker   • Smokeless tobacco: Never Used   • Alcohol use No   • Drug use: No   • Sexual activity: Yes     Other Topics Concern   • Not on file     Social History Narrative         Current Outpatient Prescriptions   Medication Sig Dispense Refill   • albuterol (PROVENTIL HFA;VENTOLIN HFA) 108 (90 BASE) MCG/ACT inhaler Inhale 2 puffs Every 4 (Four) Hours As Needed for Wheezing. 1 inhaler 2   • aspirin 325 MG tablet Take 325 mg by mouth daily.     • cholecalciferol (VITAMIN D3) 1000 UNITS tablet Take 1,000 Units by mouth daily.     • denosumab (PROLIA) 60 MG/ML solution syringe Inject  under the skin Every 6 (Six) Months.     • diphenhydrAMINE (BENADRYL) 25 MG tablet Take 1 tablet by mouth Every 6 (Six) Hours As Needed for Itching. 60 tablet 2   • ethosuximide (ZARONTIN) 250 MG capsule Take 1 capsule by mouth 2 (Two) Times a Day. 180 capsule 1   • furosemide (LASIX) 20 MG tablet Take 1 tablet by mouth 2 (Two) Times a Day As Needed (swelling). 60 tablet 2   • Lidocaine-Benzalkonium 2.5-0.13 % liquid Apply 1 application topically 2 (Two) Times a Day. 118 mL 0   • lisinopril (PRINIVIL,ZESTRIL) 10 MG tablet Take 1 tablet by mouth Daily. 90 tablet 2   • omeprazole (priLOSEC) 40 MG capsule Take 1 capsule by mouth Daily. 30 capsule 4     No current facility-administered medications for this visit.          Review of Systems     Constitution: Denies any fatigue, fever or chills    HENT: Denies any headache, hearing impairment,     Eyes: Denies any blurring of vision,  "or photophobia     Cardivascular - History of PFO closure    Respiratory system-Chronic shortness of breath       Endocrine:  Hyperlipidemia       Musculoskeletal:  No history of arthritis with musculoskeletal problems    Gastrointestinal: History of complication of hiatal hernia surgery with esophageal stricture in the past    Genitourinary: No dysuria or hematuria    Neurological:    paradoxical embolism and stroke    Psychiatric/Behavioral:       History of depression    Hematological- no history of easy bruising             OBJECTIVE    /72  Pulse 78  Ht 160 cm (63\")  Wt 62.1 kg (137 lb)  LMP  (LMP Unknown)  BMI 24.27 kg/m2      Physical Exam     Constitutional: is oriented to person, place, and time.     Skin-warm and dry    Well developed and nourished in no acute distress      Head: Normocephalic and atraumatic.     Eyes: Pupils are equal    Neck: Neck supple.     Cardiovascular: [place] in the fifth intercostal space   Regular rate, and  Rhythm,    S1 greater than S2,     Pulmonary/Chest:   Air  Entry is equal on both sides  No wheezing or crackles,      Abdominal: Soft.  Mild diffuse tenderness    Musculoskeletal: No kyphoscoliosis,     Neurological: is alert and oriented to person, place, and time.    cranial nerve are intact .   No motor or sensory deficit    Extremities-no edema, no radial femoral delay      Psychiatric: He has a normal mood and affect.                  His behavior is normal.           Procedures      Lab Results   Component Value Date    WBC 6.20 07/26/2017    HGB 11.9 (L) 07/26/2017    HCT 36.4 07/26/2017    MCV 85.0 07/26/2017     07/26/2017     Lab Results   Component Value Date    GLUCOSE 100 07/26/2017    BUN 20 07/26/2017    CREATININE 0.98 07/26/2017    EGFRIFNONA 58 07/26/2017    BCR 20.4 07/26/2017    CO2 27.0 07/26/2017    CALCIUM 9.9 07/26/2017    ALBUMIN 4.20 07/26/2017    LABIL2 1.6 07/26/2017    AST 28 07/26/2017    ALT 30 07/26/2017     Lab Results "   Component Value Date    CHOL 170 07/26/2017     Lab Results   Component Value Date    TRIG 75 07/26/2017    TRIG 81 06/28/2016    TRIG 87 04/23/2015     Lab Results   Component Value Date    HDL 78 07/26/2017    HDL 59 (L) 06/28/2016    HDL 77 04/23/2015     Lab Results   Component Value Date    LDLCALC 96 06/28/2016    LDLCALC 90 04/23/2015    LDLCALC 85 03/13/2014     No results found for: LDL  No results found for: HDLLDLRATIO  No components found for: CHOLHDL  Lab Results   Component Value Date    HGBA1C 6.12 (H) 07/26/2017     Lab Results   Component Value Date    TSH 1.21 08/18/2015                  A/P  Palpitations-doing well and blood pressure is controlled with lisinopril 10 mg daily along with Lasix as needed.    History of PFO closure for paradoxical embolism and questionable seizures.  And she is on ethosuximide    Other medical problems followed by her family doctor                  This document has been electronically signed by Johnny Oquendo MD on January 9, 2018 3:42 PM       EMR Dragon/Transcription disclaimer:   Some of this note may be an electronic transcription/translation of spoken language to printed text. The electronic translation of spoken language may permit erroneous, or at times, nonsensical words or phrases to be inadvertently transcribed; Although I have reviewed the note for such errors, some may still exist.

## 2018-01-11 ENCOUNTER — OFFICE VISIT (OUTPATIENT)
Dept: FAMILY MEDICINE CLINIC | Facility: CLINIC | Age: 58
End: 2018-01-11

## 2018-01-11 VITALS
WEIGHT: 135 LBS | HEIGHT: 63 IN | SYSTOLIC BLOOD PRESSURE: 112 MMHG | DIASTOLIC BLOOD PRESSURE: 76 MMHG | BODY MASS INDEX: 23.92 KG/M2

## 2018-01-11 DIAGNOSIS — T50.905A ADVERSE EFFECT OF DRUG, INITIAL ENCOUNTER: Primary | ICD-10-CM

## 2018-01-11 DIAGNOSIS — G40.909 SEIZURE DISORDER (HCC): ICD-10-CM

## 2018-01-11 PROCEDURE — 99213 OFFICE O/P EST LOW 20 MIN: CPT | Performed by: FAMILY MEDICINE

## 2018-01-11 RX ORDER — ETHOSUXIMIDE 250 MG/1
250 CAPSULE ORAL
Qty: 180 CAPSULE | Refills: 1 | Status: SHIPPED | OUTPATIENT
Start: 2018-01-11 | End: 2018-05-07 | Stop reason: SDUPTHER

## 2018-01-11 RX ORDER — ETHOSUXIMIDE 250 MG/1
250 CAPSULE ORAL
Qty: 180 CAPSULE | Refills: 1 | Status: SHIPPED | OUTPATIENT
Start: 2018-01-11 | End: 2018-01-11 | Stop reason: SDUPTHER

## 2018-01-11 NOTE — PROGRESS NOTES
Subjective   Cinthia Light is a 57 y.o. female.     History of Present Illness   Ms. Light is a 58yo female that presents today for follow-up on her seizure disorder. She has been having a lot of issues with her medication since they changed the generic that she got 2 months ago. She was on a formulation that worked well and was tolerated. She has red caps now and has been having issues with hives.  She also has had more issue with hives since her pills were changed.  She has had reactions to most other seizure medications in the past.        Current Outpatient Prescriptions:   •  albuterol (PROVENTIL HFA;VENTOLIN HFA) 108 (90 BASE) MCG/ACT inhaler, Inhale 2 puffs Every 4 (Four) Hours As Needed for Wheezing., Disp: 1 inhaler, Rfl: 2  •  aspirin 325 MG tablet, Take 325 mg by mouth daily., Disp: , Rfl:   •  cholecalciferol (VITAMIN D3) 1000 UNITS tablet, Take 1,000 Units by mouth daily., Disp: , Rfl:   •  denosumab (PROLIA) 60 MG/ML solution syringe, Inject  under the skin Every 6 (Six) Months., Disp: , Rfl:   •  diphenhydrAMINE (BENADRYL) 25 MG tablet, Take 1 tablet by mouth Every 6 (Six) Hours As Needed for Itching., Disp: 60 tablet, Rfl: 2  •  ethosuximide (ZARONTIN) 250 MG capsule, Take 1 capsule by mouth 2 (Two) Times a Day., Disp: 180 capsule, Rfl: 1  •  furosemide (LASIX) 20 MG tablet, Take 1 tablet by mouth 2 (Two) Times a Day As Needed (swelling)., Disp: 60 tablet, Rfl: 2  •  lisinopril (PRINIVIL,ZESTRIL) 10 MG tablet, Take 1 tablet by mouth Daily., Disp: 90 tablet, Rfl: 2  •  omeprazole (priLOSEC) 40 MG capsule, Take 1 capsule by mouth Daily., Disp: 30 capsule, Rfl: 4    The following portions of the patient's history were reviewed and updated as appropriate: allergies, current medications, past family history, past medical history, past social history, past surgical history and problem list.    Review of Systems   Constitutional: Positive for activity change and fatigue. Negative for appetite  "change, chills, fever and unexpected weight change.   Respiratory: Negative for chest tightness, shortness of breath and wheezing.    Cardiovascular: Negative for chest pain, palpitations and leg swelling.   Musculoskeletal: Positive for arthralgias and back pain.   Skin: Positive for rash. Negative for pallor and wound.   Neurological: Positive for dizziness, tremors, seizures and weakness. Negative for syncope.   Psychiatric/Behavioral: Negative for dysphoric mood and sleep disturbance. The patient is not nervous/anxious.        Objective    Vitals:    01/11/18 1403   BP: 112/76   Weight: 61.2 kg (135 lb)   Height: 160 cm (63\")       Physical Exam   Constitutional: She is oriented to person, place, and time. She appears well-developed and well-nourished. No distress.   Cardiovascular: Normal rate, regular rhythm and normal heart sounds.    No murmur heard.  No LE edema.   Pulmonary/Chest: Effort normal and breath sounds normal. No respiratory distress.   Abdominal: Soft. Bowel sounds are normal. She exhibits no distension. There is no tenderness.   Neurological: She is alert and oriented to person, place, and time.   Psychiatric: She has a normal mood and affect. Her behavior is normal. Judgment and thought content normal.   Nursing note and vitals reviewed.      Assessment/Plan   Problems Addressed this Visit        Nervous and Auditory    Seizure disorder    Relevant Medications    ethosuximide (ZARONTIN) 250 MG capsule      Other Visit Diagnoses     Adverse effect of drug, initial encounter    -  Primary        She has had a lot of issues with medications in the past. She is allergic to things that are in several medications. She was doing well on the ethosuximide that she was on before it was changed. She has the specific information from her pharmacy. Will get authorization to change back to that medication.  I think that the extra cost is worth the benefit.  Follow-up as previously planned or sooner " PRN.

## 2018-01-29 DIAGNOSIS — M81.0 OSTEOPOROSIS: ICD-10-CM

## 2018-02-06 ENCOUNTER — OFFICE VISIT (OUTPATIENT)
Dept: FAMILY MEDICINE CLINIC | Facility: CLINIC | Age: 58
End: 2018-02-06

## 2018-02-06 ENCOUNTER — APPOINTMENT (OUTPATIENT)
Dept: LAB | Facility: HOSPITAL | Age: 58
End: 2018-02-06

## 2018-02-06 VITALS
DIASTOLIC BLOOD PRESSURE: 82 MMHG | BODY MASS INDEX: 23.44 KG/M2 | HEIGHT: 63 IN | SYSTOLIC BLOOD PRESSURE: 134 MMHG | WEIGHT: 132.3 LBS

## 2018-02-06 DIAGNOSIS — R73.03 PREDIABETES: ICD-10-CM

## 2018-02-06 DIAGNOSIS — Z79.899 HIGH RISK MEDICATION USE: ICD-10-CM

## 2018-02-06 DIAGNOSIS — G40.909 SEIZURE DISORDER (HCC): Primary | ICD-10-CM

## 2018-02-06 LAB
ALBUMIN SERPL-MCNC: 4.5 G/DL (ref 3.4–4.8)
ALBUMIN/GLOB SERPL: 1.6 G/DL (ref 1.1–1.8)
ALP SERPL-CCNC: 72 U/L (ref 38–126)
ALT SERPL W P-5'-P-CCNC: 28 U/L (ref 9–52)
ANION GAP SERPL CALCULATED.3IONS-SCNC: 13 MMOL/L (ref 5–15)
AST SERPL-CCNC: 36 U/L (ref 14–36)
BILIRUB SERPL-MCNC: 0.4 MG/DL (ref 0.2–1.3)
BUN BLD-MCNC: 16 MG/DL (ref 7–21)
BUN/CREAT SERPL: 18.6 (ref 7–25)
CALCIUM SPEC-SCNC: 10.1 MG/DL (ref 8.4–10.2)
CHLORIDE SERPL-SCNC: 101 MMOL/L (ref 95–110)
CO2 SERPL-SCNC: 26 MMOL/L (ref 22–31)
CREAT BLD-MCNC: 0.86 MG/DL (ref 0.5–1)
GFR SERPL CREATININE-BSD FRML MDRD: 68 ML/MIN/1.73 (ref 60–120)
GLOBULIN UR ELPH-MCNC: 2.9 GM/DL (ref 2.3–3.5)
GLUCOSE BLD-MCNC: 93 MG/DL (ref 60–100)
HBA1C MFR BLD: 6.1 % (ref 4–5.6)
POTASSIUM BLD-SCNC: 4.4 MMOL/L (ref 3.5–5.1)
PROT SERPL-MCNC: 7.4 G/DL (ref 6.3–8.6)
SODIUM BLD-SCNC: 140 MMOL/L (ref 137–145)

## 2018-02-06 PROCEDURE — 36415 COLL VENOUS BLD VENIPUNCTURE: CPT | Performed by: FAMILY MEDICINE

## 2018-02-06 PROCEDURE — 83036 HEMOGLOBIN GLYCOSYLATED A1C: CPT | Performed by: FAMILY MEDICINE

## 2018-02-06 PROCEDURE — 80168 ASSAY OF ETHOSUXIMIDE: CPT | Performed by: FAMILY MEDICINE

## 2018-02-06 PROCEDURE — 99213 OFFICE O/P EST LOW 20 MIN: CPT | Performed by: FAMILY MEDICINE

## 2018-02-06 PROCEDURE — 80053 COMPREHEN METABOLIC PANEL: CPT | Performed by: FAMILY MEDICINE

## 2018-02-06 NOTE — PROGRESS NOTES
Subjective   Cinthia Light is a 57 y.o. female.     History of Present Illness   Ms. Light is a 66yo female that presents today for follow-up on her seizure disorder, OA, and prediabetes.  She has been back on her zarontin that she tolerated and that has been better. NO more seizures and hasn't had the side effects she was having.    She has been prediabetic. She has been watching her diet and she has lost some weight since she was here last time.  Appetite has been okay, but she hasn't been drinking much since she was here last time because it makes her nauseated.    He has been having back pain and joint pain for years. She hasn't been able to take anything for that because of all her allergies.  She says that overall that has been doing okay even with the cold weather.  She has tried topical medications and that didn't work.      Current Outpatient Prescriptions:   •  albuterol (PROVENTIL HFA;VENTOLIN HFA) 108 (90 BASE) MCG/ACT inhaler, Inhale 2 puffs Every 4 (Four) Hours As Needed for Wheezing., Disp: 1 inhaler, Rfl: 2  •  aspirin 325 MG tablet, Take 325 mg by mouth daily., Disp: , Rfl:   •  cholecalciferol (VITAMIN D3) 1000 UNITS tablet, Take 1,000 Units by mouth daily., Disp: , Rfl:   •  denosumab (PROLIA) 60 MG/ML solution syringe, Inject  under the skin Every 6 (Six) Months., Disp: , Rfl:   •  diphenhydrAMINE (BENADRYL) 25 MG tablet, Take 1 tablet by mouth Every 6 (Six) Hours As Needed for Itching., Disp: 60 tablet, Rfl: 2  •  ethosuximide (ZARONTIN) 250 MG capsule, Take 1 capsule by mouth 2 (Two) Times a Day., Disp: 180 capsule, Rfl: 1  •  furosemide (LASIX) 20 MG tablet, Take 1 tablet by mouth 2 (Two) Times a Day As Needed (swelling)., Disp: 60 tablet, Rfl: 2  •  lisinopril (PRINIVIL,ZESTRIL) 10 MG tablet, Take 1 tablet by mouth Daily., Disp: 90 tablet, Rfl: 2  •  omeprazole (priLOSEC) 40 MG capsule, Take 1 capsule by mouth Daily., Disp: 30 capsule, Rfl: 4  •  PROLIA 60 MG/ML solution  "syringe, INJECT ONE MILLILITER UNDER THE SKIN 1 TIME DOSE, Disp: 1 syringe, Rfl: 1    The following portions of the patient's history were reviewed and updated as appropriate: allergies, current medications, past family history, past medical history, past social history, past surgical history and problem list.    Review of Systems   Constitutional: Negative for activity change, appetite change, fatigue and unexpected weight change.   Eyes: Negative for visual disturbance.   Respiratory: Negative for cough, shortness of breath and wheezing.    Cardiovascular: Negative for chest pain, palpitations and leg swelling.   Gastrointestinal: Negative for abdominal distention, abdominal pain, constipation, diarrhea, nausea and vomiting.   Musculoskeletal: Positive for arthralgias, back pain and neck pain. Negative for gait problem, joint swelling, myalgias and neck stiffness.   Skin: Negative for pallor, rash and wound.   Neurological: Negative for seizures, weakness and headaches.   Psychiatric/Behavioral: Negative for dysphoric mood and sleep disturbance. The patient is not nervous/anxious.        Objective    Vitals:    02/06/18 1022   BP: 134/82   Weight: 60 kg (132 lb 4.8 oz)   Height: 160 cm (63\")       Physical Exam   Constitutional: She is oriented to person, place, and time. She appears well-developed and well-nourished. No distress.   Cardiovascular: Normal rate, regular rhythm and normal heart sounds.    No murmur heard.  No LE edema.   Pulmonary/Chest: Effort normal and breath sounds normal. No respiratory distress.   Abdominal: Soft. Bowel sounds are normal. She exhibits no distension. There is no tenderness.   Neurological: She is alert and oriented to person, place, and time.   Psychiatric: She has a normal mood and affect. Her behavior is normal. Judgment and thought content normal.   Nursing note and vitals reviewed.      Assessment/Plan   Problems Addressed this Visit        Nervous and Auditory    Seizure " disorder - Primary    Relevant Orders    Ethosuximide Level       Other    Prediabetes    Relevant Orders    Comprehensive Metabolic Panel    Hemoglobin A1c      Other Visit Diagnoses     High risk medication use        Relevant Orders    Ethosuximide Level        1.) Seizure Disorder- Overall doing well back on the other formulation of her medication.  Will continue that medication. Check levels today.  Check CMP also.  2.) Prediabetes-  Continue to monitor sugars at home PRN.  Check A1C today.  RTC in 3 months or sooner PRN.  Will do Medicare Wellness at that appointment.

## 2018-02-09 ENCOUNTER — TELEPHONE (OUTPATIENT)
Dept: FAMILY MEDICINE CLINIC | Facility: CLINIC | Age: 58
End: 2018-02-09

## 2018-02-09 LAB — ETHOSUXIMIDE SERPL-MCNC: 19 UG/ML (ref 40–100)

## 2018-02-09 NOTE — TELEPHONE ENCOUNTER
Pr Dr. MARIA LUISA Robison, Ms. Light has been called with her recent lab results & recommendations.  Continue her current medications and follow-up as planned or sooner if any problems.      ----- Message from Rosio Robison MD sent at 2/9/2018 11:22 AM CST -----  Please let her know that her sugars are still in prediabetic range.  Follow-up as planned or sooner PRN. Will recheck labs in 6 months.

## 2018-02-09 NOTE — PROGRESS NOTES
Pr Dr. MARIA LUISA Robison, Ms. Light has been called with her recent lab results & recommendations.  Continue her current medications and follow-up as planned or sooner if any problems.

## 2018-04-01 ENCOUNTER — APPOINTMENT (OUTPATIENT)
Dept: GENERAL RADIOLOGY | Facility: HOSPITAL | Age: 58
End: 2018-04-01

## 2018-04-01 ENCOUNTER — HOSPITAL ENCOUNTER (EMERGENCY)
Facility: HOSPITAL | Age: 58
Discharge: HOME | End: 2018-04-01
Attending: FAMILY MEDICINE | Admitting: FAMILY MEDICINE

## 2018-04-01 VITALS
OXYGEN SATURATION: 96 % | WEIGHT: 126 LBS | RESPIRATION RATE: 20 BRPM | BODY MASS INDEX: 22.32 KG/M2 | DIASTOLIC BLOOD PRESSURE: 64 MMHG | HEIGHT: 63 IN | TEMPERATURE: 97.9 F | HEART RATE: 76 BPM | SYSTOLIC BLOOD PRESSURE: 147 MMHG

## 2018-04-01 DIAGNOSIS — S22.32XA CLOSED FRACTURE OF ONE RIB OF LEFT SIDE, INITIAL ENCOUNTER: Primary | ICD-10-CM

## 2018-04-01 PROCEDURE — 93005 ELECTROCARDIOGRAM TRACING: CPT | Performed by: PHYSICIAN ASSISTANT

## 2018-04-01 PROCEDURE — 93010 ELECTROCARDIOGRAM REPORT: CPT | Performed by: INTERNAL MEDICINE

## 2018-04-01 PROCEDURE — 71046 X-RAY EXAM CHEST 2 VIEWS: CPT

## 2018-04-01 PROCEDURE — 99282 EMERGENCY DEPT VISIT SF MDM: CPT

## 2018-04-01 RX ORDER — SODIUM CHLORIDE 0.9 % (FLUSH) 0.9 %
10 SYRINGE (ML) INJECTION AS NEEDED
Status: DISCONTINUED | OUTPATIENT
Start: 2018-04-01 | End: 2018-04-01

## 2018-04-01 NOTE — ED PROVIDER NOTES
Subjective   Patient presents to emergency department for left lower rib pain x 2 weeks.  Endorses increase pain with cough and movement.  States she went to urgent care 4 days ago and had an XRay done with no significant findings.          History provided by:  Patient   used: No    Flank Pain   Pain location:  L flank  Pain quality: cramping and sharp    Pain severity:  Moderate  Onset quality:  Gradual  Duration:  2 weeks  Timing:  Constant  Progression:  Worsening  Chronicity:  New  Context: not trauma    Worsened by:  Coughing, deep breathing and movement  Associated symptoms: chest pain (left lower rib)    Associated symptoms: no chills, no dysuria, no fever, no nausea, no shortness of breath and no vomiting        Review of Systems   Constitutional: Negative for chills and fever.   HENT: Negative for trouble swallowing.    Eyes: Negative for visual disturbance.   Respiratory: Negative for chest tightness and shortness of breath.    Cardiovascular: Positive for chest pain (left lower rib).   Gastrointestinal: Positive for abdominal pain (chronic from hernia). Negative for nausea and vomiting.   Genitourinary: Positive for flank pain (left). Negative for dysuria and frequency.   Musculoskeletal: Negative for myalgias, neck pain and neck stiffness.   Skin: Negative for color change.   Allergic/Immunologic: Negative for immunocompromised state.   Neurological: Negative for dizziness, weakness, light-headedness and headaches.   Hematological: Does not bruise/bleed easily.   Psychiatric/Behavioral: Negative for confusion.       Past Medical History:   Diagnosis Date   • Abnormal findings on diagnostic imaging of liver     Abnormal findings on diagnostic imaging of liver and biliary tract     • Allergic rhinitis    • Backache     Backache - stress fracture     • Breast cyst    • Chronic pain    • Chronic pancreatitis    • CVA (cerebral vascular accident)    • Diabetes mellitus associated with  pancreatic disease    • Disease of esophagus, unspecified    • Diverticulitis of colon    • Dysphagia    • Epilepsy    • Esophagitis     Esophagitis - grade III      • Essential hypertension    • Fibrocystic breast    • Gastroparesis     Gastroparesis syndrome - 544min      • GERD (gastroesophageal reflux disease) 07/27/2015    Gastroesophageal reflux disease   • Hemorrhoids     Hemorrhoids - internal & external    • Hiatal hernia    • Hyperlipidemia    • Insomnia    • Irritable bowel syndrome    • Keratoconjunctivitis sicca    • Left upper quadrant pain     Left upper quadrant pain - ? partial pancreas divisum on MRCP 9/17/13      • Low back pain    • Mass of chest wall    • Nausea and vomiting    • Neck pain    • Non-diabetic hypoglycemia    • Old myocardial infarction    • Osteoporosis    • Palpitations    • Paroxysmal supraventricular tachycardia    • Primary hyperparathyroidism    • Restless legs    • Secondary hyperparathyroidism    • Seizure disorder    • SOB (shortness of breath)    • Vaginal discharge     Offensive vaginal discharge      • Vitamin deficiency     Vitamin deficiency - D   • Vitreous floaters        Allergies   Allergen Reactions   • Acetaminophen    • Adhesive Tape    • Baclofen    • Bee Pollen    • Compazine [Prochlorperazine Edisylate]    • Demerol [Meperidine]    • Etodolac    • Lamictal [Lamotrigine]    • Latex    • Lortab [Hydrocodone-Acetaminophen]    • Milk-Related Compounds    • Morphine    • Morphine And Related    • Novocain [Procaine]    • Other      Paper tape, Bee/wasp sting, muscle relaxers   • Prochlorperazine    • Promethazine    • Ranitidine Hcl    • Reglan [Metoclopramide]    • Sulfa Antibiotics    • Tramadol    • Valium [Diazepam]    • Amitriptyline Rash       Past Surgical History:   Procedure Laterality Date   • BREAST CYST ASPIRATION     • CARDIAC CATHETERIZATION     • CARPAL TUNNEL RELEASE  11/05/1990    Left carpal tunnel release   • ENDOSCOPY  07/31/2013    Colon  endoscopy 21998 (2)-Diverticulum in sigmoid colon, descending colon, & transverse colon. Internal & external hemorrhoids found.   • ENDOSCOPY  10/22/2014    EGD w/ biopsy 52013 (1) - A hiatus hernia was found in the esophagus. Esophagitis seen. Biopsy taken. Food was found in the body of the stomach   • ENDOSCOPY  07/31/2013    EGD w/ tube 25520 (3)-Esophagitis seen. Biopsy taken. Hiatus hernia in esophagus. Gastritis in stomach. Biopsy taken. Normal dudoenum. Biopsy taken   • FOREARM SURGERY      Forearm or wrist surgery (1) - excision of ganglion cyst, left wrist.Ganglion cyst,volar aspect, left wrist   • GTUBE INSERTION  04/26/2007    gastrostomy tube, jehunostomy tube & intraoperative reduction of abdominal contents into the peritoneal cavity. Paraesophageal hiatal hernia   • INJECTION OF MEDICATION  06/20/2016    Kenalog (1)   • INJECTION OF MEDICATION  07/28/2015    Toradol (1)   • LAPAROSCOPY ESOPHAGOGASTRIC FUNDOPLASTY HYBRID     • MAMMO SCREENING UNILAT DIGITAL  03/18/2014    SCREENING MAMMOGRAPHY DIGITAL  (Medicare) (1)   • OTHER SURGICAL HISTORY  08/09/1990    Ear surgery (1)-Excision of cyst from left post auricular area   • OTHER SURGICAL HISTORY  04/27/2015    ERCP 28814 (2) - Limited exam of esophagus, stomach, and duodenum were normal. Pancreatic divisum. Pancreatic stricture at the minor ampulla.Advantix stent placement. Performed at [facility].   • OTHER SURGICAL HISTORY  11/27/2002    Remove lymph nodes, neck (1)- Excision of emlarged cervical lymph node. Subcutaneous mass, right postauricular neck. Enlarged cervical lymph node, postauricular area of the neck   • OTHER SURGICAL HISTORY  03/2006    Repair of heart defect (1) - Repair of hole in heart Done @ [facility]   • PROCEDURE GENERIC CONVERTED  10/08/2015    Destruction of Benign Lesion (1-14) 50836 (1)   • PROCEDURE GENERIC CONVERTED  12/18/2014    ESOPHAGUS MOTILITY STUDY 66813 (1)   • PROCEDURE GENERIC CONVERTED   "01/07/2003    REMOVE LESION NECK/CHEST 14075 (1) - Excision of deep soft tissue tumor, posterior neck & upper back.   • THORACOTOMY  04/26/2007    Redo L thoracotomy, partial lung decoration, repair of paraesophageal hernia(Alloderm graft), placement of Marcaine infusion catheters & system, gastrostomy, feeding jejunostomy.   • TOTAL ABDOMINAL HYSTERECTOMY  03/24/2000    Exam under anesthesia & a ANDRE w/ anterior repair. Ureterovaginal prolapse w/ stress urinary incontinence, specially a 2+cystocele & 3+ uterine prolapse   • TUBAL ABDOMINAL LIGATION  03/19/1986    postpartum tubal ligation.       Family History   Problem Relation Age of Onset   • Hypertension Mother    • Cancer Father    • Lung cancer Father    • Diabetes Maternal Grandmother    • Ovarian cancer Maternal Grandmother    • Heart disease Other    • Stroke Other    • Diabetes Other    • Glaucoma Maternal Uncle    • Ovarian cancer Maternal Aunt        Social History     Social History   • Marital status:      Social History Main Topics   • Smoking status: Never Smoker   • Smokeless tobacco: Never Used   • Alcohol use No   • Drug use: No   • Sexual activity: Yes     Other Topics Concern   • Not on file           Objective      /78 (BP Location: Right arm, Patient Position: Sitting)   Pulse 84   Temp 97.9 °F (36.6 °C) (Oral)   Resp 16   Ht 160 cm (63\")   Wt 57.2 kg (126 lb)   LMP  (LMP Unknown)   SpO2 100%   BMI 22.32 kg/m²     Physical Exam   Constitutional: She is oriented to person, place, and time. She appears well-developed and well-nourished.   HENT:   Head: Normocephalic and atraumatic.   Eyes: EOM are normal. Pupils are equal, round, and reactive to light.   Cardiovascular: Normal rate, regular rhythm, normal heart sounds and intact distal pulses.    Pulmonary/Chest: Effort normal and breath sounds normal. No respiratory distress. She has no wheezes.   Abdominal: Soft. Bowel sounds are normal. She exhibits no distension and no " mass. There is no tenderness. There is no guarding.   Musculoskeletal: She exhibits tenderness.        Right shoulder: She exhibits tenderness. She exhibits no swelling, no crepitus and no deformity.        Arms:  Neurological: She is alert and oriented to person, place, and time.   Skin: Skin is warm and dry.   Psychiatric: She has a normal mood and affect. Her behavior is normal. Thought content normal.   Nursing note and vitals reviewed.      ECG 12 Lead    Date/Time: 4/1/2018 10:15 AM  Performed by: GUS ARANGO  Authorized by: GUS ARANGO   Interpreted by physician  Comparison: not compared with previous ECG   Rhythm: sinus rhythm  Rate: normal  BPM: 76  ST Segments: ST segments normal  Clinical impression: normal ECG               ED Course  ED Course   Comment By Time   Patient states she cannot take pain medications due to allergy and states she will rest and use heating pad.  Discussed likely course with her and to make sure she takes full breaths despite pain.  Stable to discharge home.   Gus Arango PA-C 04/01 1032      Xr Ribs Left With Pa Chest    Result Date: 3/27/2018  Narrative: Procedure: XR RIBS UNILATERAL WITH CHEST. History: left rib pain, history of surgery thru ribs in past, R07.81 Pleurodynia. Comparison: None Findings: Frontal view of the chest and four views of the left ribs were obtained. No radiographic evidence of acute fracture. No overt pulmonary vascular congestion, focal pulmonary parenchymal opacity, pleural effusion or pneumothorax. Cardiac silhouette normal in size. There is a moderate-sized hiatal hernia. There is a calcified nodule in the right midlung field and calcific lymph nodes in the right hilar region, likely due to old granulomatous disease. There is a closure device overlying the interatrial septum.     Impression: Impression: No radiographic evidence of fracture. Electronically signed by:  Richard Rhodes MD  3/27/2018 10:55 AM CDT Workstation:  [id number]    Xr Chest Pa & Lateral    Result Date: 4/1/2018  Narrative: PROCEDURE: Chest PA and lateral REASON FOR EXAM: chest wall pain after coughing FINDINGS: Comparison study dated July 2, 2015.  Stable hiatal hernia. . Cardiac and pulmonary vasculature are normal . Stable left upper lung field small calcified granuloma consistent with old granulomatous disease. Lungs are otherwise clear. Stable right perihilar calcified lymph nodes consistent with old granulomatous disease. Pleural spaces are normal . Left posterior seventh rib transverse fracture which appears acute to subacute. No other acute osseous abnormalities.     Impression: 1.  Acute to subacute left posterior seventh rib fracture. No pneumothorax. 2.  Hiatal hernia. 3.  Evidence of old granulomatous disease. Electronically signed by:  Roger Chong MD  4/1/2018 10:13 AM CDT Workstation: [id number]              [facility]    Final diagnoses:   Closed fracture of one rib of left side, initial encounter            Alejandro Rocha PA-C  04/01/18 1033

## 2018-04-01 NOTE — ED NOTES
Patient presented to ED with C/O left upper rib cage pain with increased pain after coughing last week. Patient has increased pain with deep breath and cough.     Gisela Gordon LPN  04/01/18 0927

## 2018-04-13 ENCOUNTER — OFFICE VISIT (OUTPATIENT)
Dept: FAMILY MEDICINE CLINIC | Facility: CLINIC | Age: 58
End: 2018-04-13

## 2018-04-13 VITALS
SYSTOLIC BLOOD PRESSURE: 142 MMHG | DIASTOLIC BLOOD PRESSURE: 90 MMHG | TEMPERATURE: 98.5 F | RESPIRATION RATE: 20 BRPM | HEART RATE: 68 BPM | HEIGHT: 63 IN | WEIGHT: 122.8 LBS | BODY MASS INDEX: 21.76 KG/M2 | OXYGEN SATURATION: 100 %

## 2018-04-13 DIAGNOSIS — S22.32XD CLOSED FRACTURE OF ONE RIB OF LEFT SIDE WITH ROUTINE HEALING, SUBSEQUENT ENCOUNTER: Primary | ICD-10-CM

## 2018-04-13 PROCEDURE — 99213 OFFICE O/P EST LOW 20 MIN: CPT | Performed by: NURSE PRACTITIONER

## 2018-04-13 NOTE — PROGRESS NOTES
Subjective   Cinthia Light is a 57 y.o. female.  ER follow-up after being diagnosed with with multiple closed rib fracture on the left.  Was seen on 04/01/2018 at [facility] after having spontaneous rib pain for two weeks.  Pain is improving and is bright having any shortness of breath.  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      HPI:  History of spontaneous rib fracture.      Rib Injury   This is a new problem. The current episode started 1 to 4 weeks ago. The problem occurs constantly. The problem has been gradually improving. The symptoms are aggravated by sneezing and coughing. She has tried acetaminophen and NSAIDs for the symptoms. The treatment provided mild relief.        The following portions of the patient's history were reviewed and updated as appropriate: allergies, current medications, past family history, past medical history, past social history, past surgical history and problem list.     Review of Systems   Constitutional: Negative.    Respiratory: Negative.    Cardiovascular: Negative.    Musculoskeletal: Negative.    Skin: Negative.        Objective   Physical Exam   Constitutional: She is oriented to person, place, and time. She appears well-developed and well-nourished.   HENT:   Head: Normocephalic.   Right Ear: External ear normal.   Left Ear: External ear normal.   Cardiovascular: Normal rate, regular rhythm and normal heart sounds.    Pulmonary/Chest: Effort normal and breath sounds normal.   Musculoskeletal: Normal range of motion.   Neurological: She is alert and oriented to person, place, and time.   Skin: Skin is warm. Capillary refill takes less than 2 seconds.   Nursing note and vitals reviewed.      Assessment/Plan   Problems Addressed this Visit     None      Visit Diagnoses     Closed fracture of one rib of left side with routine healing, subsequent encounter    -  Primary        Continue on current medications as previously prescribed   Schedule  follow-up appointment with primary care provider with primary care provider Dr. Rosio Robison    1.  Closed rib fracture of one rib [place] with routine healing, subsequent encounter:  Continue use of incentive spirometer as prescribed at the emergency department  Encouraged to continue deep breathing exercises for pneumonia prevention            This document has been electronically signed by LARRY Huff on April 13, 2018 11:17 AM '

## 2018-05-02 ENCOUNTER — TELEPHONE (OUTPATIENT)
Dept: FAMILY MEDICINE CLINIC | Facility: CLINIC | Age: 58
End: 2018-05-02

## 2018-05-07 ENCOUNTER — OFFICE VISIT (OUTPATIENT)
Dept: FAMILY MEDICINE CLINIC | Facility: CLINIC | Age: 58
End: 2018-05-07

## 2018-05-07 VITALS
BODY MASS INDEX: 22.27 KG/M2 | WEIGHT: 125.7 LBS | DIASTOLIC BLOOD PRESSURE: 86 MMHG | HEIGHT: 63 IN | SYSTOLIC BLOOD PRESSURE: 136 MMHG

## 2018-05-07 DIAGNOSIS — Z00.00 MEDICARE ANNUAL WELLNESS VISIT, SUBSEQUENT: Primary | ICD-10-CM

## 2018-05-07 DIAGNOSIS — G40.909 SEIZURE DISORDER (HCC): ICD-10-CM

## 2018-05-07 DIAGNOSIS — R60.9 SWELLING: ICD-10-CM

## 2018-05-07 PROCEDURE — G0439 PPPS, SUBSEQ VISIT: HCPCS | Performed by: FAMILY MEDICINE

## 2018-05-07 RX ORDER — LISINOPRIL 10 MG/1
10 TABLET ORAL DAILY
Qty: 90 TABLET | Refills: 2 | Status: SHIPPED | OUTPATIENT
Start: 2018-05-07

## 2018-05-07 RX ORDER — DIPHENHYDRAMINE HCL 25 MG
25 TABLET ORAL EVERY 6 HOURS PRN
Qty: 60 TABLET | Refills: 2 | Status: SHIPPED | OUTPATIENT
Start: 2018-05-07

## 2018-05-07 RX ORDER — OMEPRAZOLE 40 MG/1
40 CAPSULE, DELAYED RELEASE ORAL DAILY
Qty: 30 CAPSULE | Refills: 4 | Status: SHIPPED | OUTPATIENT
Start: 2018-05-07

## 2018-05-07 RX ORDER — FUROSEMIDE 20 MG/1
20 TABLET ORAL 2 TIMES DAILY PRN
Qty: 60 TABLET | Refills: 2 | Status: SHIPPED | OUTPATIENT
Start: 2018-05-07

## 2018-05-07 RX ORDER — ETHOSUXIMIDE 250 MG/1
250 CAPSULE ORAL
Qty: 180 CAPSULE | Refills: 1 | Status: SHIPPED | OUTPATIENT
Start: 2018-05-07

## 2018-05-07 RX ORDER — LIDOCAINE AND PRILOCAINE 25; 25 MG/G; MG/G
CREAM TOPICAL ONCE
Qty: 1 KIT | Refills: 2 | Status: SHIPPED | OUTPATIENT
Start: 2018-05-07 | End: 2018-05-07

## 2018-05-07 NOTE — PROGRESS NOTES
QUICK REFERENCE INFORMATION:  The ABCs of the Annual Wellness Visit    Subsequent Medicare Wellness Visit    HEALTH RISK ASSESSMENT    1960    Recent Hospitalizations:  Recently treated at the following:  [facility].  She had broken ribs and was seen in the ED.        Current Medical Providers:  Patient Care Team:  Rosio Robison MD as PCP - General  Rosio Robison MD as PCP - Claims Attributed  Dr. Turner- Cardiology      Smoking Status:  History   Smoking Status   • Never Smoker   Smokeless Tobacco   • Never Used       Alcohol Consumption:  History   Alcohol Use No       Depression Screen:   PHQ-2/PHQ-9 Depression Screening 5/7/2018   Little interest or pleasure in doing things 0   Feeling down, depressed, or hopeless 0   Trouble falling or staying asleep, or sleeping too much 3   Feeling tired or having little energy 0   Poor appetite or overeating 0   Feeling bad about yourself - or that you are a failure or have let yourself or your family down 0   Trouble concentrating on things, such as reading the newspaper or watching television 0   Moving or speaking so slowly that other people could have noticed. Or the opposite - being so fidgety or restless that you have been moving around a lot more than usual 2   Thoughts that you would be better off dead, or of hurting yourself in some way 0   Total Score 5   If you checked off any problems, how difficult have these problems made it for you to do your work, take care of things at home, or get along with other people? Very difficult       Health Habits and Functional and Cognitive Screening:  Functional & Cognitive Status 5/7/2018   Do you have difficulty preparing food and eating? No   Do you have difficulty bathing yourself, getting dressed or grooming yourself? No   Do you have difficulty using the toilet? No   Do you have difficulty moving around from place to place? No   Do you have trouble with steps or getting out of a bed  or a chair? No   In the past year have you fallen or experienced a near fall? Yes   Current Diet Well Balanced Diet   Dental Exam Up to date   Eye Exam Not up to date   Exercise (times per week) 6 times per week   Current Exercise Activities Include Yoga   Do you need help using the phone?  No   Are you deaf or do you have serious difficulty hearing?  No   Do you need help with transportation? No   Do you need help shopping? No   Do you need help preparing meals?  No   Do you need help with housework?  No   Do you need help with laundry? No   Do you need help taking your medications? No   Do you need help managing money? No   Do you ever drive or ride in a car without wearing a seat belt? No   Have you felt unusual stress, anger or loneliness in the last month? No   Who do you live with? Child   If you need help, do you have trouble finding someone available to you? No   Have you been bothered in the last four weeks by sexual problems? No   Do you have difficulty concentrating, remembering or making decisions? No           Does the patient have evidence of cognitive impairment? No    Aspirin use counseling: Taking ASA appropriately as indicated      Recent Lab Results:  CMP:  Lab Results   Component Value Date    BUN 16 02/06/2018    CREATININE 0.86 02/06/2018    EGFRIFNONA 68 02/06/2018    BCR 18.6 02/06/2018     02/06/2018    K 4.4 02/06/2018    CO2 26.0 02/06/2018    CALCIUM 10.1 02/06/2018    ALBUMIN 4.50 02/06/2018    LABIL2 1.6 02/06/2018    BILITOT 0.4 02/06/2018    ALKPHOS 72 02/06/2018    AST 36 02/06/2018    ALT 28 02/06/2018     Lipid Panel:  Lab Results   Component Value Date    CHOL 170 07/26/2017    TRIG 75 07/26/2017    HDL 78 07/26/2017    LDLHDL 0.99 07/26/2017     HbA1c:  Lab Results   Component Value Date    HGBA1C 6.1 (H) 02/06/2018       Visual Acuity:  No exam data present    Age-appropriate Screening Schedule:  Refer to the list below for future screening recommendations based on  patient's age, sex and/or medical conditions. Orders for these recommended tests are listed in the plan section. The patient has been provided with a written plan.    Health Maintenance   Topic Date Due   • TDAP/TD VACCINES (1 - Tdap) 06/09/1978   • LIPID PANEL  07/26/2018   • INFLUENZA VACCINE  08/01/2018   • HEMOGLOBIN A1C  08/06/2018   • DXA SCAN  01/17/2019   • MAMMOGRAM  08/10/2019   • PAP SMEAR  12/23/2019   • COLONOSCOPY  09/01/2025   • PNEUMOCOCCAL VACCINE (19-64 MEDIUM RISK)  Addressed        Subjective   History of Present Illness    Cinthia Light is a 57 y.o. female who presents for an Subsequent Wellness Visit.    The following portions of the patient's history were reviewed and updated as appropriate: allergies, current medications, past family history, past medical history, past social history, past surgical history and problem list.    Outpatient Medications Prior to Visit   Medication Sig Dispense Refill   • albuterol (PROVENTIL HFA;VENTOLIN HFA) 108 (90 BASE) MCG/ACT inhaler Inhale 2 puffs Every 4 (Four) Hours As Needed for Wheezing. 1 inhaler 2   • aspirin 325 MG tablet Take 325 mg by mouth daily.     • cholecalciferol (VITAMIN D3) 1000 UNITS tablet Take 1,000 Units by mouth daily.     • denosumab (PROLIA) 60 MG/ML solution syringe Inject  under the skin Every 6 (Six) Months.     • diphenhydrAMINE (BENADRYL) 25 MG tablet Take 1 tablet by mouth Every 6 (Six) Hours As Needed for Itching. 60 tablet 2   • ethosuximide (ZARONTIN) 250 MG capsule Take 1 capsule by mouth 2 (Two) Times a Day. 180 capsule 1   • furosemide (LASIX) 20 MG tablet Take 1 tablet by mouth 2 (Two) Times a Day As Needed (swelling). 60 tablet 2   • lisinopril (PRINIVIL,ZESTRIL) 10 MG tablet Take 1 tablet by mouth Daily. 90 tablet 2   • omeprazole (priLOSEC) 40 MG capsule Take 1 capsule by mouth Daily. 30 capsule 4   • PROLIA 60 MG/ML solution syringe INJECT ONE MILLILITER UNDER THE SKIN 1 TIME DOSE 1 syringe 1     No  facility-administered medications prior to visit.        Patient Active Problem List   Diagnosis   • Secondary hyperparathyroidism   • Restless legs   • Osteoporosis   • Old myocardial infarction   • Essential hypertension   • Chronic pancreatitis   • Seizure disorder   • SOB (shortness of breath)   • Disease of esophagus, unspecified   • Paroxysmal supraventricular tachycardia   • Palpitations   • CVA (cerebral vascular accident)   • Seizures   • Bradycardia   • Dizziness   • Prediabetes       Advance Care Planning:  has an advance directive - a copy HAS NOT been provided    Identification of Risk Factors:  Risk factors include: increased fall risk and chronic pain.    Review of Systems   Constitutional: Negative for activity change, appetite change, fatigue and unexpected weight change.   Eyes: Negative for visual disturbance.   Respiratory: Negative for cough, shortness of breath and wheezing.    Cardiovascular: Negative for chest pain, palpitations and leg swelling.   Gastrointestinal: Negative for abdominal distention, abdominal pain, constipation, diarrhea, nausea and vomiting.   Genitourinary: Positive for vaginal bleeding. Negative for decreased urine volume, difficulty urinating, dysuria, menstrual problem, pelvic pain, vaginal discharge and vaginal pain.   Musculoskeletal: Negative for arthralgias, back pain, gait problem and joint swelling.   Skin: Negative for pallor, rash and wound.   Neurological: Negative for headaches.   Psychiatric/Behavioral: Negative for dysphoric mood and sleep disturbance. The patient is not nervous/anxious.        Compared to one year ago, the patient feels her physical health is the same.  Compared to one year ago, the patient feels her mental health is the same.    Objective     Physical Exam   Constitutional: She is oriented to person, place, and time. She appears well-developed and well-nourished. No distress.   Cardiovascular: Normal rate, regular rhythm and normal heart  "sounds.    No murmur heard.  No LE edema.   Pulmonary/Chest: Effort normal and breath sounds normal. No respiratory distress.   Chest wall tenderness over ribs BL   Abdominal: Soft. Bowel sounds are normal. She exhibits no distension. There is no tenderness.   Neurological: She is alert and oriented to person, place, and time.   Psychiatric: She has a normal mood and affect. Her behavior is normal. Judgment and thought content normal.   Nursing note and vitals reviewed.      Vitals:    05/07/18 1103   BP: 136/86   Weight: 57 kg (125 lb 11.2 oz)   Height: 160 cm (63\")       Patient's Body mass index is 22.27 kg/m². BMI is above normal parameters. Follow-up plan includes:  exercise counseling and nutrition counseling.      Assessment/Plan   Patient Self-Management and Personalized Health Advice  The patient has been provided with information about: diet, exercise, weight management, fall prevention and mental health concerns and preventive services including:   · Advance directive, Fall Risk assessment done, Fall Risk plan of care done.    Visit Diagnoses:    ICD-10-CM ICD-9-CM   1. Medicare annual wellness visit, subsequent Z00.00 V70.0   2. Seizure disorder G40.909 345.90   3. Swelling R60.9 782.3       No orders of the defined types were placed in this encounter.      Outpatient Encounter Prescriptions as of 5/7/2018   Medication Sig Dispense Refill   • albuterol (PROVENTIL HFA;VENTOLIN HFA) 108 (90 BASE) MCG/ACT inhaler Inhale 2 puffs Every 4 (Four) Hours As Needed for Wheezing. 1 inhaler 2   • aspirin 325 MG tablet Take 325 mg by mouth daily.     • cholecalciferol (VITAMIN D3) 1000 UNITS tablet Take 1,000 Units by mouth daily.     • denosumab (PROLIA) 60 MG/ML solution syringe Inject  under the skin Every 6 (Six) Months.     • diphenhydrAMINE (BENADRYL) 25 MG tablet Take 1 tablet by mouth Every 6 (Six) Hours As Needed for Itching. 60 tablet 2   • ethosuximide (ZARONTIN) 250 MG capsule Take 1 capsule by mouth 2 " (Two) Times a Day. 180 capsule 1   • furosemide (LASIX) 20 MG tablet Take 1 tablet by mouth 2 (Two) Times a Day As Needed (swelling). 60 tablet 2   • lisinopril (PRINIVIL,ZESTRIL) 10 MG tablet Take 1 tablet by mouth Daily. 90 tablet 2   • omeprazole (priLOSEC) 40 MG capsule Take 1 capsule by mouth Daily. 30 capsule 4   • [DISCONTINUED] diphenhydrAMINE (BENADRYL) 25 MG tablet Take 1 tablet by mouth Every 6 (Six) Hours As Needed for Itching. 60 tablet 2   • [DISCONTINUED] ethosuximide (ZARONTIN) 250 MG capsule Take 1 capsule by mouth 2 (Two) Times a Day. 180 capsule 1   • [DISCONTINUED] furosemide (LASIX) 20 MG tablet Take 1 tablet by mouth 2 (Two) Times a Day As Needed (swelling). 60 tablet 2   • [DISCONTINUED] lisinopril (PRINIVIL,ZESTRIL) 10 MG tablet Take 1 tablet by mouth Daily. 90 tablet 2   • [DISCONTINUED] omeprazole (priLOSEC) 40 MG capsule Take 1 capsule by mouth Daily. 30 capsule 4   • lidocaine-prilocaine (EMLA) 2.5-2.5 % cream Apply  topically 1 (One) Time for 1 dose. 1 kit 2   • [DISCONTINUED] PROLIA 60 MG/ML solution syringe INJECT ONE MILLILITER UNDER THE SKIN 1 TIME DOSE 1 syringe 1     No facility-administered encounter medications on file as of 5/7/2018.        Reviewed use of high risk medication in the elderly: yes  Reviewed for potential of harmful drug interactions in the elderly: yes    Follow Up:  Return in about 3 months (around 8/7/2018) for Recheck with Juanita Sheppard.     An After Visit Summary and PPPS with all of these plans were given to the patient.

## 2018-07-09 ENCOUNTER — TELEPHONE (OUTPATIENT)
Dept: FAMILY MEDICINE CLINIC | Facility: CLINIC | Age: 58
End: 2018-07-09

## 2018-07-09 ENCOUNTER — OFFICE VISIT (OUTPATIENT)
Dept: OBSTETRICS AND GYNECOLOGY | Facility: CLINIC | Age: 58
End: 2018-07-09

## 2018-07-09 VITALS
WEIGHT: 127 LBS | BODY MASS INDEX: 22.5 KG/M2 | HEIGHT: 63 IN | DIASTOLIC BLOOD PRESSURE: 60 MMHG | SYSTOLIC BLOOD PRESSURE: 142 MMHG

## 2018-07-09 DIAGNOSIS — N94.10 DYSPAREUNIA IN FEMALE: ICD-10-CM

## 2018-07-09 DIAGNOSIS — Z11.4 ENCOUNTER FOR SCREENING FOR HIV: ICD-10-CM

## 2018-07-09 DIAGNOSIS — Z20.2 SEXUALLY TRANSMITTED DISEASE EXPOSURE: Primary | ICD-10-CM

## 2018-07-09 DIAGNOSIS — Z11.59 ENCOUNTER FOR SCREENING FOR OTHER VIRAL DISEASES (CODE): ICD-10-CM

## 2018-07-09 LAB
CANDIDA ALBICANS: NEGATIVE
GARDNERELLA VAGINALIS: POSITIVE
TRICHOMONAS VAGINALIS PCR: NEGATIVE

## 2018-07-09 PROCEDURE — 20552 NJX 1/MLT TRIGGER POINT 1/2: CPT | Performed by: OBSTETRICS & GYNECOLOGY

## 2018-07-09 PROCEDURE — 87660 TRICHOMONAS VAGIN DIR PROBE: CPT | Performed by: OBSTETRICS & GYNECOLOGY

## 2018-07-09 PROCEDURE — 87510 GARDNER VAG DNA DIR PROBE: CPT | Performed by: OBSTETRICS & GYNECOLOGY

## 2018-07-09 PROCEDURE — 87591 N.GONORRHOEAE DNA AMP PROB: CPT | Performed by: OBSTETRICS & GYNECOLOGY

## 2018-07-09 PROCEDURE — 87661 TRICHOMONAS VAGINALIS AMPLIF: CPT | Performed by: OBSTETRICS & GYNECOLOGY

## 2018-07-09 PROCEDURE — 87480 CANDIDA DNA DIR PROBE: CPT | Performed by: OBSTETRICS & GYNECOLOGY

## 2018-07-09 PROCEDURE — 99213 OFFICE O/P EST LOW 20 MIN: CPT | Performed by: OBSTETRICS & GYNECOLOGY

## 2018-07-09 PROCEDURE — 87491 CHLMYD TRACH DNA AMP PROBE: CPT | Performed by: OBSTETRICS & GYNECOLOGY

## 2018-07-09 RX ORDER — OXYCODONE HYDROCHLORIDE AND ACETAMINOPHEN 5; 325 MG/1; MG/1
1 TABLET ORAL EVERY 6 HOURS PRN
COMMUNITY

## 2018-07-09 RX ORDER — ALBUTEROL SULFATE 90 UG/1
2 AEROSOL, METERED RESPIRATORY (INHALATION) EVERY 4 HOURS PRN
Qty: 1 INHALER | Refills: 2 | Status: SHIPPED | OUTPATIENT
Start: 2018-07-09

## 2018-07-09 NOTE — TELEPHONE ENCOUNTER
[facility] IN [place] HAS CALLED FOR LINDSEY MEREDITH..SHE IS NEEDING A NEW PRESP FOR ALBUTEROL INHALER  THEIR RX# [phone]  THEIR PHONE

## 2018-07-09 NOTE — PROGRESS NOTES
Subjective   Cinthia Light is a 58 y.o. female.     Patient presents today to request STI screening. Reports likely exposure to genital warts. Discussed potential options for screening with R/B/A and limitations of each. Patient would like to proceed with screening.  Patient reports a sore area at the posterior edge of her vagina. After exam was consistent with scarring from remote obstetric lacerations the patient opted to proceed with trigger point injection. Discussed R/B/A for this.        The following portions of the patient's history were reviewed and updated as appropriate: allergies, current medications, past family history, past medical history, past social history, past surgical history and problem list.      Review of Systems   Genitourinary: Positive for vaginal pain. Negative for vaginal bleeding and vaginal discharge.       Objective   Physical Exam   Constitutional: She is oriented to person, place, and time. She appears well-developed and well-nourished. No distress.   HENT:   Head: Normocephalic and atraumatic.   Right Ear: External ear normal.   Left Ear: External ear normal.   Nose: Nose normal.   Mouth/Throat: Oropharynx is clear and moist.   Genitourinary:   Genitourinary Comments: Firm tender area at posterior fourchette. Uterus and cervix are surgically absent.  Moist pink vaginal mucosa lacks rugation. No lesions to vagina or vulva.    After signing consent and time-out the posterior fourchette was prepped in betadine and injected with 1% lidocaine with epi.   Neurological: She is alert and oriented to person, place, and time.   Skin: She is not diaphoretic.   Psychiatric: She has a normal mood and affect. Her behavior is normal. Judgment and thought content normal.   Vitals reviewed.      Assessment/Plan   Cinthia was seen today for possible vaginal wart.    Diagnoses and all orders for this visit:    Sexually transmitted disease exposure  -     Chlamydia trachomatis, Neisseria  gonorrhoeae, Trichomonas vaginalis, PCR - Swab, Vagina  -     Hepatitis B Surface Antigen  -     Hepatitis C Antibody  -     HIV-1 & HIV-2 Antibodies  -     Gardnerella vaginalis, Trichomonas vaginalis, Candida albicans, PCR - Swab, Vagina  -     RPR  -     HSV Non-Specific Antibody, IgM  -     HSV 1 & 2 - Specific Antibody, IgG    Encounter for screening for other viral diseases (CODE)   -     Hepatitis B Surface Antigen    Encounter for screening for HIV   -     HIV-1 & HIV-2 Antibodies    Dyspareunia in female       Trigger point injection at posterior fourchette  STI screening  F/u 2 wk, discuss results and check for improvement in posterior fourchette

## 2018-07-10 LAB
C TRACH RRNA CVX QL NAA+PROBE: NEGATIVE
N GONORRHOEA RRNA SPEC QL NAA+PROBE: NEGATIVE
T VAGINALIS DNA VAG QL PROBE+SIG AMP: NEGATIVE

## 2018-07-24 ENCOUNTER — OFFICE VISIT (OUTPATIENT)
Dept: OBSTETRICS AND GYNECOLOGY | Facility: CLINIC | Age: 58
End: 2018-07-24

## 2018-07-24 VITALS
BODY MASS INDEX: 23.37 KG/M2 | SYSTOLIC BLOOD PRESSURE: 112 MMHG | DIASTOLIC BLOOD PRESSURE: 82 MMHG | WEIGHT: 127 LBS | HEIGHT: 62 IN

## 2018-07-24 DIAGNOSIS — N94.10 DYSPAREUNIA IN FEMALE: Primary | ICD-10-CM

## 2018-07-24 DIAGNOSIS — A60.00 HERPES SIMPLEX INFECTION OF GENITOURINARY SYSTEM: ICD-10-CM

## 2018-07-24 PROCEDURE — 99213 OFFICE O/P EST LOW 20 MIN: CPT | Performed by: OBSTETRICS & GYNECOLOGY

## 2018-07-24 RX ORDER — METRONIDAZOLE 500 MG/1
500 TABLET ORAL 2 TIMES DAILY
Qty: 14 TABLET | Refills: 0 | Status: SHIPPED | OUTPATIENT
Start: 2018-07-24

## 2018-07-24 RX ORDER — CEPHALEXIN 500 MG/1
CAPSULE ORAL
Refills: 0 | COMMUNITY
Start: 2018-07-20

## 2018-07-24 NOTE — PROGRESS NOTES
Subjective   Cinthia Light is a 58 y.o. female.     Patient presents today for f/u regarding dyspareunia and STI screening  Reviewed results including + antibodies to HSV and BV, sent prescription for Flagyl.  Patient reports that her dysparuenia has resolved after trigger point injection.  Counseled regarding potential for transmission of HSV even when symptoms are not present with recommendation for condom use to reduce the risk of transmission.        The following portions of the patient's history were reviewed and updated as appropriate: allergies, current medications, past family history, past medical history, past social history, past surgical history and problem list.      Review of Systems   Genitourinary: Negative for dyspareunia, vaginal bleeding and vaginal pain.       Objective   Physical Exam   Constitutional: She is oriented to person, place, and time. She appears well-developed and well-nourished. No distress.   HENT:   Head: Normocephalic and atraumatic.   Right Ear: External ear normal.   Left Ear: External ear normal.   Nose: Nose normal.   Mouth/Throat: Oropharynx is clear and moist.   Neurological: She is alert and oriented to person, place, and time.   Skin: She is not diaphoretic.   Psychiatric: She has a normal mood and affect. Her behavior is normal. Judgment and thought content normal.   Vitals reviewed.      Assessment/Plan   Cinthia was seen today for results.    Diagnoses and all orders for this visit:    Dyspareunia in female    Herpes simplex infection of genitourinary system    Other orders  -     metroNIDAZOLE (FLAGYL) 500 MG tablet; Take 1 tablet by mouth 2 (Two) Times a Day.       Flagyl for BV  F/u 1 year and PRN